# Patient Record
Sex: MALE | Race: WHITE | HISPANIC OR LATINO | Employment: OTHER | URBAN - METROPOLITAN AREA
[De-identification: names, ages, dates, MRNs, and addresses within clinical notes are randomized per-mention and may not be internally consistent; named-entity substitution may affect disease eponyms.]

---

## 2017-03-15 ENCOUNTER — FOLLOW UP (OUTPATIENT)
Dept: URBAN - METROPOLITAN AREA CLINIC 26 | Facility: CLINIC | Age: 67
End: 2017-03-15

## 2017-03-15 VITALS — HEIGHT: 60 IN | SYSTOLIC BLOOD PRESSURE: 142 MMHG | HEART RATE: 78 BPM | DIASTOLIC BLOOD PRESSURE: 77 MMHG

## 2017-03-15 DIAGNOSIS — H02.836: ICD-10-CM

## 2017-03-15 DIAGNOSIS — H02.833: ICD-10-CM

## 2017-03-15 DIAGNOSIS — H01.003: ICD-10-CM

## 2017-03-15 DIAGNOSIS — H04.123: ICD-10-CM

## 2017-03-15 DIAGNOSIS — H40.9: ICD-10-CM

## 2017-03-15 DIAGNOSIS — Z79.4: ICD-10-CM

## 2017-03-15 DIAGNOSIS — H35.3131: ICD-10-CM

## 2017-03-15 DIAGNOSIS — H43.812: ICD-10-CM

## 2017-03-15 DIAGNOSIS — H35.373: ICD-10-CM

## 2017-03-15 DIAGNOSIS — H01.006: ICD-10-CM

## 2017-03-15 DIAGNOSIS — E11.3313: ICD-10-CM

## 2017-03-15 DIAGNOSIS — H43.393: ICD-10-CM

## 2017-03-15 PROCEDURE — G8427 DOCREV CUR MEDS BY ELIG CLIN: HCPCS

## 2017-03-15 PROCEDURE — 92250 FUNDUS PHOTOGRAPHY W/I&R: CPT

## 2017-03-15 PROCEDURE — G8397 DIL MACULA/FUNDUS EXAM/W DOC: HCPCS

## 2017-03-15 PROCEDURE — 2022F DILAT RTA XM EVC RTNOPTHY: CPT

## 2017-03-15 PROCEDURE — 2021F DILAT MACULAR EXAM DONE: CPT

## 2017-03-15 PROCEDURE — 5010F MACUL RESULT PHY/QHP MNG DM: CPT

## 2017-03-15 PROCEDURE — 92014 COMPRE OPH EXAM EST PT 1/>: CPT

## 2017-03-15 PROCEDURE — 4177F TALK PT/CRGVR RE AREDS PREV: CPT

## 2017-03-15 PROCEDURE — 2019F DILATED MACUL EXAM DONE: CPT

## 2017-03-15 PROCEDURE — 1036F TOBACCO NON-USER: CPT

## 2017-03-15 PROCEDURE — G8417 CALC BMI ABV UP PARAM F/U: HCPCS

## 2017-03-15 PROCEDURE — 67210 TREATMENT OF RETINAL LESION: CPT

## 2017-03-15 ASSESSMENT — TONOMETRY
OD_IOP_MMHG: 22
OS_IOP_MMHG: 15

## 2017-03-15 ASSESSMENT — VISUAL ACUITY
OS_CC: 20/30-2
OD_CC: 20/30-2

## 2017-07-19 ENCOUNTER — FOLLOW UP (OUTPATIENT)
Dept: URBAN - METROPOLITAN AREA CLINIC 26 | Facility: CLINIC | Age: 67
End: 2017-07-19

## 2017-07-19 VITALS — HEIGHT: 60 IN | HEART RATE: 75 BPM | SYSTOLIC BLOOD PRESSURE: 128 MMHG | DIASTOLIC BLOOD PRESSURE: 79 MMHG

## 2017-07-19 DIAGNOSIS — E11.3313: ICD-10-CM

## 2017-07-19 DIAGNOSIS — H35.373: ICD-10-CM

## 2017-07-19 DIAGNOSIS — Z79.4: ICD-10-CM

## 2017-07-19 DIAGNOSIS — H43.393: ICD-10-CM

## 2017-07-19 DIAGNOSIS — H40.9: ICD-10-CM

## 2017-07-19 DIAGNOSIS — H35.3131: ICD-10-CM

## 2017-07-19 DIAGNOSIS — H43.812: ICD-10-CM

## 2017-07-19 PROCEDURE — 2021F DILAT MACULAR EXAM DONE: CPT

## 2017-07-19 PROCEDURE — 92235 FLUORESCEIN ANGRPH MLTIFRAME: CPT

## 2017-07-19 PROCEDURE — G8397 DIL MACULA/FUNDUS EXAM/W DOC: HCPCS

## 2017-07-19 PROCEDURE — 2019F DILATED MACUL EXAM DONE: CPT

## 2017-07-19 PROCEDURE — 92014 COMPRE OPH EXAM EST PT 1/>: CPT

## 2017-07-19 PROCEDURE — G8427 DOCREV CUR MEDS BY ELIG CLIN: HCPCS

## 2017-07-19 PROCEDURE — 4177F TALK PT/CRGVR RE AREDS PREV: CPT

## 2017-07-19 PROCEDURE — 2022F DILAT RTA XM EVC RTNOPTHY: CPT

## 2017-07-19 PROCEDURE — 67210 TREATMENT OF RETINAL LESION: CPT

## 2017-07-19 PROCEDURE — 92250 FUNDUS PHOTOGRAPHY W/I&R: CPT

## 2017-07-19 PROCEDURE — 5010F MACUL RESULT PHY/QHP MNG DM: CPT

## 2017-07-19 ASSESSMENT — VISUAL ACUITY
OD_PH: 20/25-3
OD_CC: 20/50+2
OS_CC: 20/25+2

## 2017-07-19 ASSESSMENT — TONOMETRY
OS_IOP_MMHG: 17
OD_IOP_MMHG: 16

## 2017-12-14 ENCOUNTER — FOLLOW UP (OUTPATIENT)
Dept: URBAN - METROPOLITAN AREA CLINIC 26 | Facility: CLINIC | Age: 67
End: 2017-12-14

## 2017-12-14 VITALS — DIASTOLIC BLOOD PRESSURE: 80 MMHG | HEART RATE: 60 BPM | HEIGHT: 60 IN | SYSTOLIC BLOOD PRESSURE: 140 MMHG

## 2017-12-14 DIAGNOSIS — E11.3313: ICD-10-CM

## 2017-12-14 DIAGNOSIS — H43.393: ICD-10-CM

## 2017-12-14 DIAGNOSIS — Z79.4: ICD-10-CM

## 2017-12-14 DIAGNOSIS — H40.9: ICD-10-CM

## 2017-12-14 DIAGNOSIS — H35.373: ICD-10-CM

## 2017-12-14 DIAGNOSIS — H43.812: ICD-10-CM

## 2017-12-14 DIAGNOSIS — H35.3131: ICD-10-CM

## 2017-12-14 PROCEDURE — 2019F DILATED MACUL EXAM DONE: CPT

## 2017-12-14 PROCEDURE — 92250 FUNDUS PHOTOGRAPHY W/I&R: CPT

## 2017-12-14 PROCEDURE — G8427 DOCREV CUR MEDS BY ELIG CLIN: HCPCS

## 2017-12-14 PROCEDURE — 92235 FLUORESCEIN ANGRPH MLTIFRAME: CPT

## 2017-12-14 PROCEDURE — 5010F MACUL RESULT PHY/QHP MNG DM: CPT

## 2017-12-14 PROCEDURE — G8397 DIL MACULA/FUNDUS EXAM/W DOC: HCPCS

## 2017-12-14 PROCEDURE — 67210 TREATMENT OF RETINAL LESION: CPT

## 2017-12-14 PROCEDURE — 2022F DILAT RTA XM EVC RTNOPTHY: CPT

## 2017-12-14 PROCEDURE — 92134 CPTRZ OPH DX IMG PST SGM RTA: CPT

## 2017-12-14 PROCEDURE — 2021F DILAT MACULAR EXAM DONE: CPT

## 2017-12-14 PROCEDURE — 92014 COMPRE OPH EXAM EST PT 1/>: CPT

## 2017-12-14 PROCEDURE — 4177F TALK PT/CRGVR RE AREDS PREV: CPT

## 2017-12-14 ASSESSMENT — VISUAL ACUITY
OD_CC: 20/30
OS_CC: 20/25-2

## 2017-12-14 ASSESSMENT — TONOMETRY
OS_IOP_MMHG: 17
OD_IOP_MMHG: 17

## 2017-12-20 ENCOUNTER — CLINIC PROCEDURE ONLY (OUTPATIENT)
Dept: URBAN - METROPOLITAN AREA CLINIC 26 | Facility: CLINIC | Age: 67
End: 2017-12-20

## 2017-12-20 DIAGNOSIS — E11.3313: ICD-10-CM

## 2017-12-20 PROCEDURE — J2778** LUCENTIS 0.1MG

## 2017-12-20 PROCEDURE — 67028 INJECTION EYE DRUG: CPT

## 2017-12-20 ASSESSMENT — VISUAL ACUITY: OD_CC: 20/40-

## 2017-12-20 ASSESSMENT — TONOMETRY: OD_IOP_MMHG: 16

## 2018-01-31 ENCOUNTER — CLINIC PROCEDURE ONLY (OUTPATIENT)
Dept: URBAN - METROPOLITAN AREA CLINIC 26 | Facility: CLINIC | Age: 68
End: 2018-01-31

## 2018-01-31 DIAGNOSIS — E11.3313: ICD-10-CM

## 2018-01-31 PROCEDURE — J2778** LUCENTIS 0.1MG

## 2018-01-31 PROCEDURE — 67028 INJECTION EYE DRUG: CPT

## 2018-01-31 ASSESSMENT — VISUAL ACUITY: OD_CC: 20/40-1

## 2018-01-31 ASSESSMENT — TONOMETRY: OD_IOP_MMHG: 16

## 2018-03-28 ENCOUNTER — FOLLOW UP AND POST INJECTION EVALUATION (OUTPATIENT)
Dept: URBAN - METROPOLITAN AREA CLINIC 26 | Facility: CLINIC | Age: 68
End: 2018-03-28

## 2018-03-28 VITALS
BODY MASS INDEX: 39.4 KG/M2 | HEIGHT: 68 IN | WEIGHT: 260 LBS | DIASTOLIC BLOOD PRESSURE: 90 MMHG | HEART RATE: 73 BPM | SYSTOLIC BLOOD PRESSURE: 167 MMHG

## 2018-03-28 DIAGNOSIS — H43.393: ICD-10-CM

## 2018-03-28 DIAGNOSIS — H01.003: ICD-10-CM

## 2018-03-28 DIAGNOSIS — Z79.4: ICD-10-CM

## 2018-03-28 DIAGNOSIS — H35.3131: ICD-10-CM

## 2018-03-28 DIAGNOSIS — E11.3313: ICD-10-CM

## 2018-03-28 DIAGNOSIS — H02.836: ICD-10-CM

## 2018-03-28 DIAGNOSIS — H40.9: ICD-10-CM

## 2018-03-28 DIAGNOSIS — H02.833: ICD-10-CM

## 2018-03-28 DIAGNOSIS — H04.123: ICD-10-CM

## 2018-03-28 DIAGNOSIS — H35.373: ICD-10-CM

## 2018-03-28 DIAGNOSIS — H43.812: ICD-10-CM

## 2018-03-28 DIAGNOSIS — H01.006: ICD-10-CM

## 2018-03-28 PROCEDURE — 92250 FUNDUS PHOTOGRAPHY W/I&R: CPT

## 2018-03-28 PROCEDURE — 92014 COMPRE OPH EXAM EST PT 1/>: CPT

## 2018-03-28 PROCEDURE — 67210 TREATMENT OF RETINAL LESION: CPT

## 2018-03-28 PROCEDURE — 92235 FLUORESCEIN ANGRPH MLTIFRAME: CPT

## 2018-03-28 PROCEDURE — 92134 CPTRZ OPH DX IMG PST SGM RTA: CPT

## 2018-03-28 ASSESSMENT — VISUAL ACUITY
OS_CC: 20/25
OD_CC: 20/30-2

## 2018-03-28 ASSESSMENT — TONOMETRY
OD_IOP_MMHG: 19
OS_IOP_MMHG: 22

## 2018-04-03 ENCOUNTER — CLINIC PROCEDURE ONLY (OUTPATIENT)
Dept: URBAN - METROPOLITAN AREA CLINIC 26 | Facility: CLINIC | Age: 68
End: 2018-04-03

## 2018-04-03 DIAGNOSIS — E11.3313: ICD-10-CM

## 2018-04-03 PROCEDURE — 67028 INJECTION EYE DRUG: CPT

## 2018-04-03 PROCEDURE — J2778** LUCENTIS 0.1MG

## 2018-04-03 ASSESSMENT — VISUAL ACUITY: OD_CC: 20/30

## 2018-04-03 ASSESSMENT — TONOMETRY: OD_IOP_MMHG: 16

## 2018-05-15 ENCOUNTER — CLINIC PROCEDURE ONLY (OUTPATIENT)
Dept: URBAN - METROPOLITAN AREA CLINIC 26 | Facility: CLINIC | Age: 68
End: 2018-05-15

## 2018-05-15 DIAGNOSIS — E11.3313: ICD-10-CM

## 2018-05-15 PROCEDURE — 67028 INJECTION EYE DRUG: CPT

## 2018-05-15 PROCEDURE — J2778** LUCENTIS 0.1MG

## 2018-05-15 ASSESSMENT — TONOMETRY: OD_IOP_MMHG: 21

## 2018-05-15 ASSESSMENT — VISUAL ACUITY: OD_CC: 20/30

## 2018-07-10 ENCOUNTER — FOLLOW UP (OUTPATIENT)
Dept: URBAN - METROPOLITAN AREA CLINIC 26 | Facility: CLINIC | Age: 68
End: 2018-07-10

## 2018-07-10 DIAGNOSIS — H35.373: ICD-10-CM

## 2018-07-10 DIAGNOSIS — H43.393: ICD-10-CM

## 2018-07-10 DIAGNOSIS — H40.9: ICD-10-CM

## 2018-07-10 DIAGNOSIS — E11.3313: ICD-10-CM

## 2018-07-10 DIAGNOSIS — H35.3131: ICD-10-CM

## 2018-07-10 DIAGNOSIS — H04.123: ICD-10-CM

## 2018-07-10 DIAGNOSIS — H43.812: ICD-10-CM

## 2018-07-10 DIAGNOSIS — Z79.4: ICD-10-CM

## 2018-07-10 PROCEDURE — 92250 FUNDUS PHOTOGRAPHY W/I&R: CPT

## 2018-07-10 PROCEDURE — 92134 CPTRZ OPH DX IMG PST SGM RTA: CPT

## 2018-07-10 PROCEDURE — 92014 COMPRE OPH EXAM EST PT 1/>: CPT

## 2018-07-10 PROCEDURE — 67210 TREATMENT OF RETINAL LESION: CPT

## 2018-07-10 PROCEDURE — 92235 FLUORESCEIN ANGRPH MLTIFRAME: CPT

## 2018-07-10 ASSESSMENT — VISUAL ACUITY
OD_CC: 20/30+2
OS_CC: 20/25-2

## 2018-07-10 ASSESSMENT — TONOMETRY
OS_IOP_MMHG: 17
OD_IOP_MMHG: 21

## 2018-07-18 ENCOUNTER — CLINIC PROCEDURE ONLY (OUTPATIENT)
Dept: URBAN - METROPOLITAN AREA CLINIC 26 | Facility: CLINIC | Age: 68
End: 2018-07-18

## 2018-07-18 DIAGNOSIS — E11.3313: ICD-10-CM

## 2018-07-18 PROCEDURE — J2778** LUCENTIS 0.1MG

## 2018-07-18 PROCEDURE — 67028 INJECTION EYE DRUG: CPT

## 2018-07-18 ASSESSMENT — TONOMETRY
OD_IOP_MMHG: 24
OD_IOP_MMHG: 10

## 2018-07-18 ASSESSMENT — VISUAL ACUITY: OD_CC: 20/50+2

## 2018-08-29 ENCOUNTER — CLINIC PROCEDURE ONLY (OUTPATIENT)
Dept: URBAN - METROPOLITAN AREA CLINIC 26 | Facility: CLINIC | Age: 68
End: 2018-08-29

## 2018-08-29 DIAGNOSIS — E11.3313: ICD-10-CM

## 2018-08-29 PROCEDURE — 67028 INJECTION EYE DRUG: CPT

## 2018-08-29 ASSESSMENT — VISUAL ACUITY: OD_CC: 20/40-

## 2018-08-29 ASSESSMENT — TONOMETRY: OD_IOP_MMHG: 13

## 2018-10-31 ENCOUNTER — FOLLOW UP (OUTPATIENT)
Dept: URBAN - METROPOLITAN AREA CLINIC 26 | Facility: CLINIC | Age: 68
End: 2018-10-31

## 2018-10-31 VITALS — DIASTOLIC BLOOD PRESSURE: 82 MMHG | SYSTOLIC BLOOD PRESSURE: 147 MMHG | HEIGHT: 60 IN | HEART RATE: 70 BPM

## 2018-10-31 DIAGNOSIS — Z79.4: ICD-10-CM

## 2018-10-31 DIAGNOSIS — H40.9: ICD-10-CM

## 2018-10-31 DIAGNOSIS — H43.393: ICD-10-CM

## 2018-10-31 DIAGNOSIS — H01.003: ICD-10-CM

## 2018-10-31 DIAGNOSIS — H35.3131: ICD-10-CM

## 2018-10-31 DIAGNOSIS — H43.812: ICD-10-CM

## 2018-10-31 DIAGNOSIS — H35.373: ICD-10-CM

## 2018-10-31 DIAGNOSIS — H02.836: ICD-10-CM

## 2018-10-31 DIAGNOSIS — H04.123: ICD-10-CM

## 2018-10-31 DIAGNOSIS — H01.006: ICD-10-CM

## 2018-10-31 DIAGNOSIS — H02.833: ICD-10-CM

## 2018-10-31 DIAGNOSIS — E11.3313: ICD-10-CM

## 2018-10-31 PROCEDURE — 92134 CPTRZ OPH DX IMG PST SGM RTA: CPT

## 2018-10-31 PROCEDURE — 92250 FUNDUS PHOTOGRAPHY W/I&R: CPT

## 2018-10-31 PROCEDURE — 67028 INJECTION EYE DRUG: CPT

## 2018-10-31 PROCEDURE — 92235 FLUORESCEIN ANGRPH MLTIFRAME: CPT

## 2018-10-31 PROCEDURE — 92014 COMPRE OPH EXAM EST PT 1/>: CPT

## 2018-10-31 ASSESSMENT — VISUAL ACUITY
OD_CC: 20/25
OS_CC: 20/15-2

## 2018-10-31 ASSESSMENT — TONOMETRY
OD_IOP_MMHG: 24
OS_IOP_MMHG: 25

## 2019-01-30 ENCOUNTER — FOLLOW UP (OUTPATIENT)
Dept: URBAN - METROPOLITAN AREA CLINIC 26 | Facility: CLINIC | Age: 69
End: 2019-01-30

## 2019-01-30 VITALS
DIASTOLIC BLOOD PRESSURE: 79 MMHG | HEIGHT: 68 IN | HEART RATE: 95 BPM | WEIGHT: 275 LBS | BODY MASS INDEX: 41.68 KG/M2 | SYSTOLIC BLOOD PRESSURE: 145 MMHG

## 2019-01-30 DIAGNOSIS — H43.393: ICD-10-CM

## 2019-01-30 DIAGNOSIS — Z79.4: ICD-10-CM

## 2019-01-30 DIAGNOSIS — H04.123: ICD-10-CM

## 2019-01-30 DIAGNOSIS — H43.812: ICD-10-CM

## 2019-01-30 DIAGNOSIS — E11.3313: ICD-10-CM

## 2019-01-30 DIAGNOSIS — H02.836: ICD-10-CM

## 2019-01-30 DIAGNOSIS — H35.3131: ICD-10-CM

## 2019-01-30 DIAGNOSIS — H35.373: ICD-10-CM

## 2019-01-30 DIAGNOSIS — H02.833: ICD-10-CM

## 2019-01-30 DIAGNOSIS — H40.9: ICD-10-CM

## 2019-01-30 PROCEDURE — 92235 FLUORESCEIN ANGRPH MLTIFRAME: CPT

## 2019-01-30 PROCEDURE — 92014 COMPRE OPH EXAM EST PT 1/>: CPT

## 2019-01-30 PROCEDURE — 92250 FUNDUS PHOTOGRAPHY W/I&R: CPT

## 2019-01-30 PROCEDURE — 67028 INJECTION EYE DRUG: CPT

## 2019-01-30 PROCEDURE — 92134 CPTRZ OPH DX IMG PST SGM RTA: CPT

## 2019-01-30 ASSESSMENT — TONOMETRY
OD_IOP_MMHG: 16
OS_IOP_MMHG: 15

## 2019-01-30 ASSESSMENT — VISUAL ACUITY
OD_CC: 20/20-2
OS_CC: 20/15-

## 2019-03-13 ENCOUNTER — CLINIC PROCEDURE ONLY (OUTPATIENT)
Dept: URBAN - METROPOLITAN AREA CLINIC 26 | Facility: CLINIC | Age: 69
End: 2019-03-13

## 2019-03-13 DIAGNOSIS — E11.3313: ICD-10-CM

## 2019-03-13 PROCEDURE — J2778** LUCENTIS 0.1MG

## 2019-03-13 PROCEDURE — 67028 INJECTION EYE DRUG: CPT

## 2019-03-13 ASSESSMENT — TONOMETRY
OD_IOP_MMHG: 25
OD_IOP_MMHG: 22

## 2019-03-13 ASSESSMENT — VISUAL ACUITY: OD_SC: 20/20-2

## 2019-04-24 ENCOUNTER — FOLLOW UP AND POST INJECTION EVALUATION (OUTPATIENT)
Dept: URBAN - METROPOLITAN AREA CLINIC 26 | Facility: CLINIC | Age: 69
End: 2019-04-24

## 2019-04-24 VITALS — HEIGHT: 68 IN | WEIGHT: 277 LBS | BODY MASS INDEX: 41.98 KG/M2

## 2019-04-24 DIAGNOSIS — H02.836: ICD-10-CM

## 2019-04-24 DIAGNOSIS — H02.833: ICD-10-CM

## 2019-04-24 DIAGNOSIS — H01.003: ICD-10-CM

## 2019-04-24 DIAGNOSIS — H35.373: ICD-10-CM

## 2019-04-24 DIAGNOSIS — E11.3313: ICD-10-CM

## 2019-04-24 DIAGNOSIS — H43.393: ICD-10-CM

## 2019-04-24 DIAGNOSIS — H40.9: ICD-10-CM

## 2019-04-24 DIAGNOSIS — H04.123: ICD-10-CM

## 2019-04-24 DIAGNOSIS — H01.006: ICD-10-CM

## 2019-04-24 DIAGNOSIS — Z79.4: ICD-10-CM

## 2019-04-24 DIAGNOSIS — H43.812: ICD-10-CM

## 2019-04-24 DIAGNOSIS — H35.3131: ICD-10-CM

## 2019-04-24 PROCEDURE — 76514 ECHO EXAM OF EYE THICKNESS: CPT

## 2019-04-24 PROCEDURE — 92134 CPTRZ OPH DX IMG PST SGM RTA: CPT

## 2019-04-24 PROCEDURE — 92250 FUNDUS PHOTOGRAPHY W/I&R: CPT

## 2019-04-24 PROCEDURE — 92014 COMPRE OPH EXAM EST PT 1/>: CPT

## 2019-04-24 ASSESSMENT — TONOMETRY
OD_IOP_MMHG: 22
OS_IOP_MMHG: 24

## 2019-04-24 ASSESSMENT — VISUAL ACUITY
OS_CC: 20/20-
OD_CC: 20/20-

## 2019-07-01 ENCOUNTER — FOLLOW UP AND POST INJECTION EVALUATION (OUTPATIENT)
Dept: URBAN - METROPOLITAN AREA CLINIC 26 | Facility: CLINIC | Age: 69
End: 2019-07-01

## 2019-07-01 VITALS — SYSTOLIC BLOOD PRESSURE: 172 MMHG | DIASTOLIC BLOOD PRESSURE: 87 MMHG | HEART RATE: 80 BPM | HEIGHT: 60 IN

## 2019-07-01 DIAGNOSIS — H35.373: ICD-10-CM

## 2019-07-01 DIAGNOSIS — H40.9: ICD-10-CM

## 2019-07-01 DIAGNOSIS — H43.393: ICD-10-CM

## 2019-07-01 DIAGNOSIS — Z79.4: ICD-10-CM

## 2019-07-01 DIAGNOSIS — E11.3313: ICD-10-CM

## 2019-07-01 DIAGNOSIS — H35.3131: ICD-10-CM

## 2019-07-01 DIAGNOSIS — H43.812: ICD-10-CM

## 2019-07-01 PROCEDURE — 92250 FUNDUS PHOTOGRAPHY W/I&R: CPT

## 2019-07-01 PROCEDURE — 67028 INJECTION EYE DRUG: CPT

## 2019-07-01 PROCEDURE — 92134 CPTRZ OPH DX IMG PST SGM RTA: CPT

## 2019-07-01 PROCEDURE — 92020 GONIOSCOPY: CPT

## 2019-07-01 PROCEDURE — 92014 COMPRE OPH EXAM EST PT 1/>: CPT

## 2019-07-01 PROCEDURE — 92235 FLUORESCEIN ANGRPH MLTIFRAME: CPT

## 2019-07-01 ASSESSMENT — VISUAL ACUITY
OD_CC: 20/20-1
OS_CC: 20/20-1

## 2019-07-01 ASSESSMENT — TONOMETRY
OD_IOP_MMHG: 18
OS_IOP_MMHG: 20

## 2019-10-07 ENCOUNTER — FOLLOW UP AND POST INJECTION EVALUATION (OUTPATIENT)
Dept: URBAN - METROPOLITAN AREA CLINIC 26 | Facility: CLINIC | Age: 69
End: 2019-10-07

## 2019-10-07 VITALS — DIASTOLIC BLOOD PRESSURE: 73 MMHG | HEART RATE: 89 BPM | SYSTOLIC BLOOD PRESSURE: 143 MMHG | HEIGHT: 60 IN

## 2019-10-07 DIAGNOSIS — H35.373: ICD-10-CM

## 2019-10-07 DIAGNOSIS — H43.812: ICD-10-CM

## 2019-10-07 DIAGNOSIS — Z79.4: ICD-10-CM

## 2019-10-07 DIAGNOSIS — H02.833: ICD-10-CM

## 2019-10-07 DIAGNOSIS — H35.3131: ICD-10-CM

## 2019-10-07 DIAGNOSIS — H01.006: ICD-10-CM

## 2019-10-07 DIAGNOSIS — H01.003: ICD-10-CM

## 2019-10-07 DIAGNOSIS — H02.836: ICD-10-CM

## 2019-10-07 DIAGNOSIS — H04.123: ICD-10-CM

## 2019-10-07 DIAGNOSIS — E11.3313: ICD-10-CM

## 2019-10-07 DIAGNOSIS — H43.393: ICD-10-CM

## 2019-10-07 DIAGNOSIS — H40.9: ICD-10-CM

## 2019-10-07 PROCEDURE — 92250 FUNDUS PHOTOGRAPHY W/I&R: CPT

## 2019-10-07 PROCEDURE — J2778** LUCENTIS 0.1MG

## 2019-10-07 PROCEDURE — 92134 CPTRZ OPH DX IMG PST SGM RTA: CPT

## 2019-10-07 PROCEDURE — 92235 FLUORESCEIN ANGRPH MLTIFRAME: CPT

## 2019-10-07 PROCEDURE — 67028 INJECTION EYE DRUG: CPT

## 2019-10-07 PROCEDURE — 92014 COMPRE OPH EXAM EST PT 1/>: CPT

## 2019-10-07 ASSESSMENT — VISUAL ACUITY
OS_CC: 20/40+1
OS_PH: 20/25+2
OD_CC: 20/25+2

## 2019-10-07 ASSESSMENT — TONOMETRY
OD_IOP_MMHG: 18
OS_IOP_MMHG: 18

## 2020-06-02 ENCOUNTER — FOLLOW UP AND POST INJECTION EVALUATION (OUTPATIENT)
Dept: URBAN - METROPOLITAN AREA CLINIC 26 | Facility: CLINIC | Age: 70
End: 2020-06-02

## 2020-06-02 VITALS
HEIGHT: 68 IN | BODY MASS INDEX: 42.13 KG/M2 | DIASTOLIC BLOOD PRESSURE: 89 MMHG | WEIGHT: 278 LBS | SYSTOLIC BLOOD PRESSURE: 170 MMHG | HEART RATE: 84 BPM

## 2020-06-02 DIAGNOSIS — H40.9: ICD-10-CM

## 2020-06-02 DIAGNOSIS — H35.373: ICD-10-CM

## 2020-06-02 DIAGNOSIS — E11.3313: ICD-10-CM

## 2020-06-02 DIAGNOSIS — H35.3131: ICD-10-CM

## 2020-06-02 DIAGNOSIS — H43.393: ICD-10-CM

## 2020-06-02 DIAGNOSIS — Z79.4: ICD-10-CM

## 2020-06-02 DIAGNOSIS — H43.812: ICD-10-CM

## 2020-06-02 PROCEDURE — 92014 COMPRE OPH EXAM EST PT 1/>: CPT

## 2020-06-02 PROCEDURE — 92235 FLUORESCEIN ANGRPH MLTIFRAME: CPT

## 2020-06-02 PROCEDURE — 92134 CPTRZ OPH DX IMG PST SGM RTA: CPT

## 2020-06-02 PROCEDURE — 92250 FUNDUS PHOTOGRAPHY W/I&R: CPT

## 2020-06-02 ASSESSMENT — VISUAL ACUITY
OD_CC: 20/25-1
OS_CC: 20/20-1

## 2020-06-02 ASSESSMENT — TONOMETRY
OD_IOP_MMHG: 17
OS_IOP_MMHG: 18

## 2020-06-08 ENCOUNTER — TELEPHONE ENCOUNTER (OUTPATIENT)
Dept: URBAN - METROPOLITAN AREA CLINIC 68 | Facility: CLINIC | Age: 70
End: 2020-06-08

## 2020-08-12 ENCOUNTER — OFFICE VISIT (OUTPATIENT)
Dept: URBAN - METROPOLITAN AREA CLINIC 68 | Facility: CLINIC | Age: 70
End: 2020-08-12

## 2020-08-26 ENCOUNTER — TELEPHONE ENCOUNTER (OUTPATIENT)
Dept: URBAN - METROPOLITAN AREA CLINIC 68 | Facility: CLINIC | Age: 70
End: 2020-08-26

## 2020-11-04 ENCOUNTER — OFFICE VISIT (OUTPATIENT)
Dept: URBAN - METROPOLITAN AREA CLINIC 68 | Facility: CLINIC | Age: 70
End: 2020-11-04

## 2020-11-23 ENCOUNTER — TELEPHONE ENCOUNTER (OUTPATIENT)
Dept: URBAN - METROPOLITAN AREA CLINIC 68 | Facility: CLINIC | Age: 70
End: 2020-11-23

## 2020-12-02 ENCOUNTER — FOLLOW UP (OUTPATIENT)
Dept: URBAN - METROPOLITAN AREA CLINIC 26 | Facility: CLINIC | Age: 70
End: 2020-12-02

## 2020-12-02 VITALS — SYSTOLIC BLOOD PRESSURE: 150 MMHG | HEIGHT: 60 IN | DIASTOLIC BLOOD PRESSURE: 75 MMHG | HEART RATE: 75 BPM

## 2020-12-02 DIAGNOSIS — H35.3131: ICD-10-CM

## 2020-12-02 DIAGNOSIS — H40.9: ICD-10-CM

## 2020-12-02 DIAGNOSIS — H35.373: ICD-10-CM

## 2020-12-02 DIAGNOSIS — H43.812: ICD-10-CM

## 2020-12-02 DIAGNOSIS — H02.836: ICD-10-CM

## 2020-12-02 DIAGNOSIS — H02.833: ICD-10-CM

## 2020-12-02 DIAGNOSIS — Z79.4: ICD-10-CM

## 2020-12-02 DIAGNOSIS — H04.123: ICD-10-CM

## 2020-12-02 DIAGNOSIS — H01.003: ICD-10-CM

## 2020-12-02 DIAGNOSIS — E11.3313: ICD-10-CM

## 2020-12-02 DIAGNOSIS — H43.393: ICD-10-CM

## 2020-12-02 DIAGNOSIS — H01.006: ICD-10-CM

## 2020-12-02 PROCEDURE — 92235 FLUORESCEIN ANGRPH MLTIFRAME: CPT

## 2020-12-02 PROCEDURE — 92020 GONIOSCOPY: CPT

## 2020-12-02 PROCEDURE — 92134 CPTRZ OPH DX IMG PST SGM RTA: CPT

## 2020-12-02 PROCEDURE — 92014 COMPRE OPH EXAM EST PT 1/>: CPT

## 2020-12-02 PROCEDURE — 92250 FUNDUS PHOTOGRAPHY W/I&R: CPT

## 2020-12-02 RX ORDER — LATANOPROST 50 UG/ML: 1 SOLUTION/ DROPS OPHTHALMIC EVERY EVENING

## 2020-12-02 ASSESSMENT — VISUAL ACUITY
OS_CC: 20/20-1
OD_CC: 20/20-1

## 2020-12-02 ASSESSMENT — TONOMETRY
OD_IOP_MMHG: 20
OD_IOP_MMHG: 20
OS_IOP_MMHG: 30
OS_IOP_MMHG: 22
OD_IOP_MMHG: 29
OS_IOP_MMHG: 23

## 2021-01-27 ENCOUNTER — OFFICE VISIT (OUTPATIENT)
Dept: URBAN - METROPOLITAN AREA CLINIC 68 | Facility: CLINIC | Age: 71
End: 2021-01-27

## 2021-02-01 ENCOUNTER — TELEPHONE ENCOUNTER (OUTPATIENT)
Dept: URBAN - METROPOLITAN AREA CLINIC 68 | Facility: CLINIC | Age: 71
End: 2021-02-01

## 2021-02-02 ENCOUNTER — OFFICE VISIT (OUTPATIENT)
Dept: URBAN - METROPOLITAN AREA CLINIC 68 | Facility: CLINIC | Age: 71
End: 2021-02-02

## 2021-04-05 ENCOUNTER — TELEPHONE ENCOUNTER (OUTPATIENT)
Dept: URBAN - METROPOLITAN AREA CLINIC 68 | Facility: CLINIC | Age: 71
End: 2021-04-05

## 2021-04-06 ENCOUNTER — OFFICE VISIT (OUTPATIENT)
Dept: URBAN - METROPOLITAN AREA CLINIC 68 | Facility: CLINIC | Age: 71
End: 2021-04-06

## 2021-04-21 ENCOUNTER — OFFICE VISIT (OUTPATIENT)
Dept: URBAN - METROPOLITAN AREA CLINIC 68 | Facility: CLINIC | Age: 71
End: 2021-04-21

## 2021-05-26 ENCOUNTER — OFFICE VISIT (OUTPATIENT)
Dept: URBAN - METROPOLITAN AREA CLINIC 68 | Facility: CLINIC | Age: 71
End: 2021-05-26

## 2021-06-01 ENCOUNTER — FOLLOW UP (OUTPATIENT)
Dept: URBAN - METROPOLITAN AREA CLINIC 26 | Facility: CLINIC | Age: 71
End: 2021-06-01

## 2021-06-01 VITALS
HEIGHT: 68 IN | BODY MASS INDEX: 37.59 KG/M2 | HEART RATE: 73 BPM | SYSTOLIC BLOOD PRESSURE: 151 MMHG | DIASTOLIC BLOOD PRESSURE: 79 MMHG | WEIGHT: 248 LBS

## 2021-06-01 DIAGNOSIS — H35.3131: ICD-10-CM

## 2021-06-01 DIAGNOSIS — H43.393: ICD-10-CM

## 2021-06-01 DIAGNOSIS — H43.812: ICD-10-CM

## 2021-06-01 DIAGNOSIS — Z79.4: ICD-10-CM

## 2021-06-01 DIAGNOSIS — H35.373: ICD-10-CM

## 2021-06-01 DIAGNOSIS — H40.9: ICD-10-CM

## 2021-06-01 DIAGNOSIS — E11.3313: ICD-10-CM

## 2021-06-01 PROCEDURE — 92250 FUNDUS PHOTOGRAPHY W/I&R: CPT

## 2021-06-01 PROCEDURE — 92014 COMPRE OPH EXAM EST PT 1/>: CPT

## 2021-06-01 PROCEDURE — 92134 CPTRZ OPH DX IMG PST SGM RTA: CPT

## 2021-06-01 PROCEDURE — 92235 FLUORESCEIN ANGRPH MLTIFRAME: CPT

## 2021-06-01 ASSESSMENT — TONOMETRY
OS_IOP_MMHG: 22
OD_IOP_MMHG: 17
OS_IOP_MMHG: 20
OD_IOP_MMHG: 21

## 2021-06-01 ASSESSMENT — VISUAL ACUITY
OS_CC: 20/25-2
OD_CC: 20/20

## 2021-06-03 ENCOUNTER — TELEPHONE ENCOUNTER (OUTPATIENT)
Dept: URBAN - METROPOLITAN AREA CLINIC 68 | Facility: CLINIC | Age: 71
End: 2021-06-03

## 2021-06-24 ENCOUNTER — OFFICE VISIT (OUTPATIENT)
Dept: URBAN - METROPOLITAN AREA CLINIC 68 | Facility: CLINIC | Age: 71
End: 2021-06-24

## 2021-07-15 ENCOUNTER — OFFICE VISIT (OUTPATIENT)
Dept: URBAN - METROPOLITAN AREA CLINIC 68 | Facility: CLINIC | Age: 71
End: 2021-07-15

## 2021-07-20 ENCOUNTER — OFFICE VISIT (OUTPATIENT)
Dept: URBAN - METROPOLITAN AREA CLINIC 68 | Facility: CLINIC | Age: 71
End: 2021-07-20

## 2021-08-03 ENCOUNTER — TELEPHONE ENCOUNTER (OUTPATIENT)
Dept: URBAN - METROPOLITAN AREA CLINIC 68 | Facility: CLINIC | Age: 71
End: 2021-08-03

## 2021-10-03 ENCOUNTER — LAB OUTSIDE AN ENCOUNTER (OUTPATIENT)
Dept: URBAN - METROPOLITAN AREA CLINIC 68 | Facility: CLINIC | Age: 71
End: 2021-10-03

## 2021-10-05 ENCOUNTER — OFFICE VISIT (OUTPATIENT)
Dept: URBAN - METROPOLITAN AREA CLINIC 68 | Facility: CLINIC | Age: 71
End: 2021-10-05

## 2021-10-13 ENCOUNTER — TELEPHONE ENCOUNTER (OUTPATIENT)
Dept: URBAN - METROPOLITAN AREA CLINIC 68 | Facility: CLINIC | Age: 71
End: 2021-10-13

## 2021-12-01 ENCOUNTER — FOLLOW UP (OUTPATIENT)
Dept: URBAN - METROPOLITAN AREA CLINIC 26 | Facility: CLINIC | Age: 71
End: 2021-12-01

## 2021-12-01 VITALS — SYSTOLIC BLOOD PRESSURE: 138 MMHG | HEIGHT: 60 IN | DIASTOLIC BLOOD PRESSURE: 73 MMHG | HEART RATE: 79 BPM

## 2021-12-01 DIAGNOSIS — H35.373: ICD-10-CM

## 2021-12-01 DIAGNOSIS — H43.393: ICD-10-CM

## 2021-12-01 DIAGNOSIS — H43.812: ICD-10-CM

## 2021-12-01 DIAGNOSIS — E11.3311: ICD-10-CM

## 2021-12-01 DIAGNOSIS — E11.3312: ICD-10-CM

## 2021-12-01 DIAGNOSIS — H04.123: ICD-10-CM

## 2021-12-01 DIAGNOSIS — H40.9: ICD-10-CM

## 2021-12-01 DIAGNOSIS — H35.3131: ICD-10-CM

## 2021-12-01 DIAGNOSIS — Z79.4: ICD-10-CM

## 2021-12-01 PROCEDURE — 92014 COMPRE OPH EXAM EST PT 1/>: CPT

## 2021-12-01 PROCEDURE — 92134 CPTRZ OPH DX IMG PST SGM RTA: CPT

## 2021-12-01 PROCEDURE — 92235 FLUORESCEIN ANGRPH MLTIFRAME: CPT

## 2021-12-01 PROCEDURE — 92250 FUNDUS PHOTOGRAPHY W/I&R: CPT

## 2021-12-01 ASSESSMENT — TONOMETRY
OS_IOP_MMHG: 16
OD_IOP_MMHG: 13

## 2021-12-01 ASSESSMENT — VISUAL ACUITY
OD_CC: 20/20-2
OS_CC: 20/20

## 2022-01-03 ENCOUNTER — OFFICE VISIT (OUTPATIENT)
Dept: URBAN - METROPOLITAN AREA CLINIC 68 | Facility: CLINIC | Age: 72
End: 2022-01-03

## 2022-01-10 ENCOUNTER — TELEPHONE ENCOUNTER (OUTPATIENT)
Dept: URBAN - METROPOLITAN AREA CLINIC 68 | Facility: CLINIC | Age: 72
End: 2022-01-10

## 2022-03-21 ENCOUNTER — OFFICE VISIT (OUTPATIENT)
Dept: URBAN - METROPOLITAN AREA CLINIC 68 | Facility: CLINIC | Age: 72
End: 2022-03-21

## 2022-06-01 ENCOUNTER — FOLLOW UP (OUTPATIENT)
Dept: URBAN - METROPOLITAN AREA CLINIC 26 | Facility: CLINIC | Age: 72
End: 2022-06-01

## 2022-06-01 VITALS
HEART RATE: 62 BPM | DIASTOLIC BLOOD PRESSURE: 77 MMHG | SYSTOLIC BLOOD PRESSURE: 146 MMHG | WEIGHT: 255 LBS | BODY MASS INDEX: 38.65 KG/M2 | HEIGHT: 68 IN

## 2022-06-01 ASSESSMENT — VISUAL ACUITY
OD_CC: 20/30-1
OS_CC: 20/30-1

## 2022-06-01 ASSESSMENT — TONOMETRY
OS_IOP_MMHG: 18
OD_IOP_MMHG: 20

## 2022-06-04 ENCOUNTER — TELEPHONE ENCOUNTER (OUTPATIENT)
Dept: URBAN - METROPOLITAN AREA CLINIC 68 | Facility: CLINIC | Age: 72
End: 2022-06-04

## 2022-06-04 RX ORDER — GABAPENTIN 100 MG/1
GABAPENTIN( 100MG ORAL 2 2  AT BEDTIME ) DISCONTINUED -HX ENTRY CAPSULE ORAL
OUTPATIENT
Start: 2022-03-21 | End: 2022-03-21

## 2022-06-04 RX ORDER — LISINOPRIL 5 MG/1
LISINOPRIL( 5MG ORAL  DAILY ) INACTIVE -HX ENTRY TABLET ORAL DAILY
OUTPATIENT
Start: 2019-01-28

## 2022-06-04 RX ORDER — HYALURONATE,MOD.,NON-CROSSLINK 24 MG/3 ML
SYRINGE (ML) INTRAARTICULAR
OUTPATIENT
Start: 2019-01-28

## 2022-06-04 RX ORDER — HYDROMORPHONE HYDROCHLORIDE 4 MG/1
HYDROMORPHONE HCL( 4MG ORAL 1 AS NEEDED ) INACTIVE -HX ENTRY TABLET ORAL AS NEEDED
OUTPATIENT
Start: 2021-04-06

## 2022-06-04 RX ORDER — BUDESONIDE 9 MG/1
TABLET, EXTENDED RELEASE ORAL DAILY
Qty: 1 | Refills: 0 | OUTPATIENT
Start: 2017-08-07 | End: 2017-08-13

## 2022-06-04 RX ORDER — ZINC OXIDE 200 MG/G
OINTMENT TOPICAL AS DIRECTED
Qty: 30 | Refills: 0 | OUTPATIENT
Start: 2021-04-21 | End: 2021-05-21

## 2022-06-04 RX ORDER — SULFASALAZINE 500 MG/1
SULFASALAZINE( 500MG ORAL 2 BY MOUTH TWICE EACH DAY.  ) INACTIVE -HX ENTRY TABLET ORAL
OUTPATIENT
Start: 2017-12-14

## 2022-06-04 RX ORDER — MIRABEGRON 25 MG/1
MYRBETRIQ( 25MG ORAL  DAILY ) INACTIVE -HX ENTRY TABLET, FILM COATED, EXTENDED RELEASE ORAL DAILY
OUTPATIENT
Start: 2021-04-06

## 2022-06-04 RX ORDER — DEXLANSOPRAZOLE 60 MG/1
DEXILANT( 60MG ORAL  DAILY ) INACTIVE -HX ENTRY CAPSULE, DELAYED RELEASE ORAL DAILY
OUTPATIENT
Start: 2020-12-03

## 2022-06-04 RX ORDER — PRAVASTATIN SODIUM 20 MG/1
PRAVASTATIN SODIUM( 20MG ORAL 1 AT BEDTIME ) INACTIVE -HX ENTRY TABLET ORAL AT BEDTIME
OUTPATIENT
Start: 2018-03-15

## 2022-06-04 RX ORDER — LISINOPRIL 5 MG/1
LISINOPRIL( 5MG ORAL  DAILY ) DISCONTINUED -HX ENTRY TABLET ORAL DAILY
OUTPATIENT
Start: 2022-03-21 | End: 2022-03-21

## 2022-06-04 RX ORDER — MESALAMINE 1.2 G/1
TABLET, DELAYED RELEASE ORAL DAILY
Qty: 48 | Refills: 48 | OUTPATIENT
Start: 2017-09-28 | End: 2018-01-04

## 2022-06-04 RX ORDER — TAMSULOSIN HYDROCHLORIDE 0.4 MG/1
FLOMAX( 0.4MG ORAL  DAILY ) INACTIVE -HX ENTRY CAPSULE ORAL DAILY
OUTPATIENT
Start: 2019-06-11

## 2022-06-04 RX ORDER — DEXLANSOPRAZOLE 60 MG/1
CAPSULE, DELAYED RELEASE ORAL DAILY
Qty: 20 | Refills: 0 | OUTPATIENT
Start: 2018-05-23 | End: 2018-06-12

## 2022-06-04 RX ORDER — ROSUVASTATIN CALCIUM 5 MG
CRESTOR( 5MG ORAL  DAILY ) INACTIVE -HX ENTRY TABLET ORAL DAILY
OUTPATIENT
Start: 2021-07-20

## 2022-06-04 RX ORDER — LOSARTAN POTASSIUM 25 MG/1
LOSARTAN POTASSIUM( 25MG ORAL  DAILY ) INACTIVE -HX ENTRY TABLET ORAL DAILY
OUTPATIENT
Start: 2021-07-20

## 2022-06-04 RX ORDER — FLUTICASONE FUROATE AND VILANTEROL TRIFENATATE 200; 25 UG/1; UG/1
BREO ELLIPTA( 200-25MCG/INH INHALATION  DAILY ) INACTIVE -HX ENTRY POWDER RESPIRATORY (INHALATION) DAILY
OUTPATIENT
Start: 2021-07-20

## 2022-06-04 RX ORDER — ROSUVASTATIN CALCIUM 10 MG
CRESTOR( 10MG ORAL  DAILY ) INACTIVE -HX ENTRY TABLET ORAL DAILY
OUTPATIENT
Start: 2019-06-11

## 2022-06-04 RX ORDER — DOCUSATE SODIUM 100 MG/1
COLACE( 100MG ORAL 2 DAILY ) INACTIVE -HX ENTRY CAPSULE ORAL DAILY
OUTPATIENT
Start: 2021-07-20

## 2022-06-04 RX ORDER — CANAGLIFLOZIN AND METFORMIN HYDROCHLORIDE 50; 1000 MG/1; MG/1
INVOKAMET XR( 50-1000MG ORAL 2 DAILY ) INACTIVE -HX ENTRY TABLET, FILM COATED, EXTENDED RELEASE ORAL DAILY
OUTPATIENT
Start: 2018-01-04

## 2022-06-04 RX ORDER — BACLOFEN 10 MG/1
BACLOFEN( 10MG ORAL 1 THREE TIMES DAILY ) INACTIVE -HX ENTRY TABLET ORAL
OUTPATIENT
Start: 2019-12-03

## 2022-06-04 RX ORDER — HYDROXYCHLOROQUINE SULFATE 200 MG/1
HYDROXYCHLOROQUINE SULFATE( 200MG ORAL 1 TWO TIMES DAILY ) INACTIVE -HX ENTRY TABLET, FILM COATED ORAL
OUTPATIENT
Start: 2021-07-20

## 2022-06-04 RX ORDER — METOPROLOL SUCCINATE 25 MG/1
METOPROLOL SUCCINATE ER( 25MG ORAL 1 TWICE A DAY ) INACTIVE -HX ENTRY TABLET, EXTENDED RELEASE ORAL TWICE A DAY
OUTPATIENT
Start: 2018-01-04

## 2022-06-05 ENCOUNTER — TELEPHONE ENCOUNTER (OUTPATIENT)
Dept: URBAN - METROPOLITAN AREA CLINIC 68 | Facility: CLINIC | Age: 72
End: 2022-06-05

## 2022-06-05 RX ORDER — VALSARTAN 80 MG/1
VALSARTAN( 80MG ORAL 1 TABLET DAILY ) ACTIVE -HX ENTRY TABLET, FILM COATED ORAL DAILY
Status: ACTIVE | COMMUNITY
Start: 2022-03-21

## 2022-06-05 RX ORDER — FLUTICASONE PROPIONATE 50 UG/1
FLUTICASONE PROPIONATE( 50MCG/ACT NASAL  DAILY ) ACTIVE -HX ENTRY SPRAY, METERED NASAL DAILY
Status: ACTIVE | COMMUNITY
Start: 2022-03-21

## 2022-06-05 RX ORDER — EMPAGLIFLOZIN 25 MG/1
JARDIANCE( 25MG ORAL 1/2 DAILY ) ACTIVE -HX ENTRY TABLET, FILM COATED ORAL DAILY
Status: ACTIVE | COMMUNITY
Start: 2022-03-21

## 2022-06-05 RX ORDER — DORZOLAMIDE HCL/PF 2 %
DORZOLAMIDE HCL( 2% OPHTHALMIC 2 DROPS TWICE A DAY ) ACTIVE -HX ENTRY DROPS OPHTHALMIC (EYE) TWICE A DAY
Status: ACTIVE | COMMUNITY
Start: 2022-03-21

## 2022-06-05 RX ORDER — ALBUTEROL SULFATE 90 UG/1
VENTOLIN HFA( 108 (90 BASE)MCG/ACT INHALATION  AS NEEDED ) ACTIVE -HX ENTRY AEROSOL, METERED RESPIRATORY (INHALATION) AS NEEDED
Status: ACTIVE | COMMUNITY
Start: 2022-03-21

## 2022-06-05 RX ORDER — FLUTICASONE FUROATE, UMECLIDINIUM BROMIDE AND VILANTEROL TRIFENATATE 200; 62.5; 25 UG/1; UG/1; UG/1
TRELEGY ELLIPTA( 200-62.5-25MCG/INH INHALATION  PRN ) ACTIVE -HX ENTRY POWDER RESPIRATORY (INHALATION) PRN
Status: ACTIVE | COMMUNITY
Start: 2022-03-21

## 2022-06-05 RX ORDER — SITAGLIPTIN AND METFORMIN HYDROCHLORIDE 50; 1000 MG/1; MG/1
JANUMET( 50-1000MG ORAL  TWICE DAILY ) ACTIVE -HX ENTRY TABLET, FILM COATED ORAL TWICE DAILY
Status: ACTIVE | COMMUNITY
Start: 2022-03-21

## 2022-06-05 RX ORDER — LEVOTHYROXINE SODIUM 125 UG/1
LEVOTHYROXINE SODIUM( 125MCG ORAL 1 DAILY ) ACTIVE -HX ENTRY TABLET ORAL DAILY
Status: ACTIVE | COMMUNITY
Start: 2022-03-21

## 2022-06-05 RX ORDER — IPRATROPIUM BROMIDE AND ALBUTEROL SULFATE 2.5; .5 MG/3ML; MG/3ML
IPRATROPIUM-ALBUTEROL( 0.5-2.5 (3)MG/3ML INHALATION  AS NEEDED ) ACTIVE -HX ENTRY SOLUTION RESPIRATORY (INHALATION) AS NEEDED
Status: ACTIVE | COMMUNITY
Start: 2022-03-21

## 2022-06-05 RX ORDER — NALOXONE HYDROCHLORIDE 4 MG/.1ML
NARCAN( 4MG/0.1ML NASAL  AS NEEDED ) ACTIVE -HX ENTRY SPRAY NASAL AS NEEDED
Status: ACTIVE | COMMUNITY
Start: 2022-03-21

## 2022-06-05 RX ORDER — MORPHINE SULFATE 15 MG
MORPHINE SULFATE( 15MG ORAL 1/2 TABLET EVERY8  HOURS AS NEEDED  ) ACTIVE -HX ENTRY TABLET ORAL
Status: ACTIVE | COMMUNITY
Start: 2022-03-21

## 2022-06-05 RX ORDER — TIZANIDINE HYDROCHLORIDE 4 MG/1
TIZANIDINE HCL( 4MG ORAL 1 EVERY 8 HOURS ) ACTIVE -HX ENTRY CAPSULE ORAL EVERY 8 HOURS
Status: ACTIVE | COMMUNITY
Start: 2022-03-21

## 2022-06-05 RX ORDER — METHYLCELLULOSE 500 MG/1
CITRUCEL( 500MG ORAL 2 AS NEEDED ) ACTIVE -HX ENTRY TABLET ORAL AS NEEDED
Status: ACTIVE | COMMUNITY
Start: 2022-03-21

## 2022-06-05 RX ORDER — CARBOXYMETHYLCELLULOSE SODIUM 10 MG/ML
THERATEARS( 1% OPHTHALMIC 2 GTTS ) ACTIVE -HX ENTRY GEL OPHTHALMIC
Status: ACTIVE | COMMUNITY
Start: 2022-03-21

## 2022-06-05 RX ORDER — FLUTICASONE PROPIONATE 44 UG/1
FLOVENT HFA( 44MCG/ACT INHALATION  AS NEEDED ) ACTIVE -HX ENTRY AEROSOL, METERED RESPIRATORY (INHALATION) AS NEEDED
Status: ACTIVE | COMMUNITY
Start: 2022-03-21

## 2022-06-13 ENCOUNTER — OFFICE VISIT (OUTPATIENT)
Dept: URBAN - METROPOLITAN AREA CLINIC 68 | Facility: CLINIC | Age: 72
End: 2022-06-13

## 2022-06-25 ENCOUNTER — TELEPHONE ENCOUNTER (OUTPATIENT)
Age: 72
End: 2022-06-25

## 2022-06-25 RX ORDER — DEXLANSOPRAZOLE 60 MG/1
CAPSULE, DELAYED RELEASE ORAL DAILY
Qty: 20 | Refills: 0 | OUTPATIENT
Start: 2018-05-23 | End: 2018-06-12

## 2022-06-25 RX ORDER — LISINOPRIL 5 MG/1
LISINOPRIL( 5MG ORAL  DAILY ) DISCONTINUED -HX ENTRY TABLET ORAL DAILY
OUTPATIENT
Start: 2022-03-21 | End: 2022-03-21

## 2022-06-25 RX ORDER — ROSUVASTATIN CALCIUM 10 MG
CRESTOR( 10MG ORAL  DAILY ) INACTIVE -HX ENTRY TABLET ORAL DAILY
OUTPATIENT
Start: 2019-06-11

## 2022-06-25 RX ORDER — BUDESONIDE 9 MG/1
TABLET, EXTENDED RELEASE ORAL DAILY
Qty: 1 | Refills: 0 | OUTPATIENT
Start: 2017-08-07 | End: 2017-08-13

## 2022-06-25 RX ORDER — METOPROLOL SUCCINATE 25 MG/1
METOPROLOL SUCCINATE ER( 25MG ORAL 1 TWICE A DAY ) INACTIVE -HX ENTRY TABLET, EXTENDED RELEASE ORAL TWICE A DAY
OUTPATIENT
Start: 2018-01-04

## 2022-06-25 RX ORDER — HYALURONATE,MOD.,NON-CROSSLINK 24 MG/3 ML
SYRINGE (ML) INTRAARTICULAR
OUTPATIENT
Start: 2019-01-28

## 2022-06-25 RX ORDER — PRAVASTATIN SODIUM 20 MG/1
PRAVASTATIN SODIUM( 20MG ORAL 1 AT BEDTIME ) INACTIVE -HX ENTRY TABLET ORAL AT BEDTIME
OUTPATIENT
Start: 2018-03-15

## 2022-06-25 RX ORDER — ZINC OXIDE 200 MG/G
OINTMENT TOPICAL AS DIRECTED
Qty: 30 | Refills: 0 | OUTPATIENT
Start: 2021-04-21 | End: 2021-05-21

## 2022-06-25 RX ORDER — SULFASALAZINE 500 MG/1
SULFASALAZINE( 500MG ORAL 2 BY MOUTH TWICE EACH DAY.  ) INACTIVE -HX ENTRY TABLET ORAL
OUTPATIENT
Start: 2017-12-14

## 2022-06-25 RX ORDER — LISINOPRIL 5 MG/1
LISINOPRIL( 5MG ORAL  DAILY ) INACTIVE -HX ENTRY TABLET ORAL DAILY
OUTPATIENT
Start: 2019-01-28

## 2022-06-25 RX ORDER — HYDROMORPHONE HYDROCHLORIDE 4 MG/1
HYDROMORPHONE HCL( 4MG ORAL 1 AS NEEDED ) INACTIVE -HX ENTRY TABLET ORAL AS NEEDED
OUTPATIENT
Start: 2021-04-06

## 2022-06-25 RX ORDER — MESALAMINE 1.2 G/1
TABLET, DELAYED RELEASE ORAL DAILY
Qty: 48 | Refills: 48 | OUTPATIENT
Start: 2017-09-28 | End: 2018-01-04

## 2022-06-25 RX ORDER — TAMSULOSIN HCL 0.4 MG
FLOMAX( 0.4MG ORAL  DAILY ) INACTIVE -HX ENTRY CAPSULE ORAL DAILY
OUTPATIENT
Start: 2019-06-11

## 2022-06-25 RX ORDER — LOSARTAN POTASSIUM 25 MG/1
LOSARTAN POTASSIUM( 25MG ORAL  DAILY ) INACTIVE -HX ENTRY TABLET, FILM COATED ORAL DAILY
OUTPATIENT
Start: 2021-07-20

## 2022-06-25 RX ORDER — ROSUVASTATIN CALCIUM 5 MG
CRESTOR( 5MG ORAL  DAILY ) INACTIVE -HX ENTRY TABLET ORAL DAILY
OUTPATIENT
Start: 2021-07-20

## 2022-06-25 RX ORDER — MIRABEGRON 25 MG/1
MYRBETRIQ( 25MG ORAL  DAILY ) INACTIVE -HX ENTRY TABLET, FILM COATED, EXTENDED RELEASE ORAL DAILY
OUTPATIENT
Start: 2021-04-06

## 2022-06-25 RX ORDER — DEXLANSOPRAZOLE 60 MG/1
DEXILANT( 60MG ORAL  DAILY ) INACTIVE -HX ENTRY CAPSULE, DELAYED RELEASE ORAL DAILY
OUTPATIENT
Start: 2020-12-03

## 2022-06-25 RX ORDER — DOCUSATE SODIUM 100 MG/1
COLACE( 100MG ORAL 2 DAILY ) INACTIVE -HX ENTRY CAPSULE ORAL DAILY
OUTPATIENT
Start: 2021-07-20

## 2022-06-25 RX ORDER — LIDOCAINE, MENTHOL, METHYL SALICYLATE, CAMPHOR 4; 3; 9; 1.2 1/1; 1/1; 1/1; 1/1
CBD OIL(   17MG DAILY ) INACTIVE -HX ENTRY PATCH TOPICAL DAILY
OUTPATIENT
Start: 2021-07-20

## 2022-06-25 RX ORDER — ADALIMUMAB 40MG/0.8ML
HUMIRA( 40MG/0.8ML SUBCUTANEOUS  AS DIRECTED ) INACTIVE -HX ENTRY KIT SUBCUTANEOUS AS DIRECTED
OUTPATIENT
Start: 2017-08-31

## 2022-06-25 RX ORDER — GABAPENTIN 100 MG/1
GABAPENTIN( 100MG ORAL 2 2  AT BEDTIME ) DISCONTINUED -HX ENTRY CAPSULE ORAL
OUTPATIENT
Start: 2022-03-21 | End: 2022-03-21

## 2022-06-25 RX ORDER — CANAGLIFLOZIN AND METFORMIN HYDROCHLORIDE 50; 1000 MG/1; MG/1
INVOKAMET XR( 50-1000MG ORAL 2 DAILY ) INACTIVE -HX ENTRY TABLET, FILM COATED, EXTENDED RELEASE ORAL DAILY
OUTPATIENT
Start: 2018-01-04

## 2022-06-25 RX ORDER — HYDROXYCHLOROQUINE SULFATE 200 MG/1
HYDROXYCHLOROQUINE SULFATE( 200MG ORAL 1 TWO TIMES DAILY ) INACTIVE -HX ENTRY TABLET, FILM COATED ORAL
OUTPATIENT
Start: 2021-07-20

## 2022-06-25 RX ORDER — BACLOFEN 10 MG/1
BACLOFEN( 10MG ORAL 1 THREE TIMES DAILY ) INACTIVE -HX ENTRY TABLET ORAL
OUTPATIENT
Start: 2019-12-03

## 2022-06-26 ENCOUNTER — TELEPHONE ENCOUNTER (OUTPATIENT)
Age: 72
End: 2022-06-26

## 2022-06-26 RX ORDER — TIZANIDINE HYDROCHLORIDE 4 MG/1
TIZANIDINE HCL( 4MG ORAL 1 EVERY 8 HOURS ) ACTIVE -HX ENTRY CAPSULE ORAL EVERY 8 HOURS
Status: ACTIVE | COMMUNITY
Start: 2022-06-13

## 2022-06-26 RX ORDER — CARBOXYMETHYLCELLULOSE SODIUM 10 MG/ML
THERATEARS( 1% OPHTHALMIC 2 GTTS ) ACTIVE -HX ENTRY GEL OPHTHALMIC
Status: ACTIVE | COMMUNITY
Start: 2022-06-13

## 2022-06-26 RX ORDER — VALSARTAN 80 MG/1
VALSARTAN( 80MG ORAL 1 TABLET DAILY ) ACTIVE -HX ENTRY TABLET, COATED ORAL DAILY
Status: ACTIVE | COMMUNITY
Start: 2022-06-13

## 2022-06-26 RX ORDER — LATANOPROST/PF 0.005 %
LATANOPROST( 0.005% OPHTHALMIC  AS DIRECTED ) ACTIVE -HX ENTRY DROPS OPHTHALMIC (EYE) AS DIRECTED
Status: ACTIVE | COMMUNITY
Start: 2022-06-13

## 2022-06-26 RX ORDER — SITAGLIPTIN AND METFORMIN HYDROCHLORIDE 1000; 50 MG/1; MG/1
JANUMET( 50-1000MG ORAL  TWICE DAILY ) ACTIVE -HX ENTRY TABLET, FILM COATED ORAL TWICE DAILY
Status: ACTIVE | COMMUNITY
Start: 2022-06-13

## 2022-06-26 RX ORDER — EMPAGLIFLOZIN 25 MG/1
JARDIANCE( 25MG ORAL 1/2 DAILY ) ACTIVE -HX ENTRY TABLET, FILM COATED ORAL DAILY
Status: ACTIVE | COMMUNITY
Start: 2022-06-13

## 2022-06-26 RX ORDER — ALBUTEROL SULFATE 90 UG/1
VENTOLIN HFA( 108 (90 BASE)MCG/ACT INHALATION  AS NEEDED ) ACTIVE -HX ENTRY AEROSOL, METERED RESPIRATORY (INHALATION) AS NEEDED
Status: ACTIVE | COMMUNITY
Start: 2022-06-13

## 2022-06-26 RX ORDER — DORZOLAMIDE HCL/PF 2 %
DORZOLAMIDE HCL( 2% OPHTHALMIC 2 DROPS TWICE A DAY ) ACTIVE -HX ENTRY DROPS OPHTHALMIC (EYE) TWICE A DAY
Status: ACTIVE | COMMUNITY
Start: 2022-03-21

## 2022-06-26 RX ORDER — MORPHINE SULFATE 15 MG
MORPHINE SULFATE( 15MG ORAL 1/2 TABLET EVERY8  HOURS AS NEEDED  ) ACTIVE -HX ENTRY TABLET ORAL
Status: ACTIVE | COMMUNITY
Start: 2022-06-13

## 2022-06-26 RX ORDER — NALOXONE HYDROCHLORIDE 4 MG/.1ML
NARCAN( 4MG/0.1ML NASAL  AS NEEDED ) ACTIVE -HX ENTRY SPRAY NASAL AS NEEDED
Status: ACTIVE | COMMUNITY
Start: 2022-06-13

## 2022-06-26 RX ORDER — LORATADINE 5 MG
TABLET,CHEWABLE ORAL
Qty: 1 | Refills: 0 | Status: ACTIVE | COMMUNITY
Start: 2022-06-13

## 2022-06-26 RX ORDER — LEVOTHYROXINE SODIUM 125 UG/1
LEVOTHYROXINE SODIUM( 125MCG ORAL 1 DAILY ) ACTIVE -HX ENTRY TABLET ORAL DAILY
Status: ACTIVE | COMMUNITY
Start: 2022-06-13

## 2022-06-26 RX ORDER — IPRATROPIUM BROMIDE AND ALBUTEROL SULFATE 2.5; .5 MG/3ML; MG/3ML
IPRATROPIUM-ALBUTEROL( 0.5-2.5 (3)MG/3ML INHALATION  AS NEEDED ) ACTIVE -HX ENTRY SOLUTION RESPIRATORY (INHALATION) AS NEEDED
Status: ACTIVE | COMMUNITY
Start: 2022-06-13

## 2022-06-26 RX ORDER — FLUTICASONE PROPIONATE 50 UG/1
FLUTICASONE PROPIONATE( 50MCG/ACT NASAL  DAILY ) ACTIVE -HX ENTRY SPRAY, METERED NASAL DAILY
Status: ACTIVE | COMMUNITY
Start: 2022-06-13

## 2022-06-26 RX ORDER — METHYLCELLULOSE 500 MG/1
CITRUCEL( 500MG ORAL 2 AS NEEDED ) ACTIVE -HX ENTRY TABLET ORAL AS NEEDED
Status: ACTIVE | COMMUNITY
Start: 2022-06-13

## 2022-06-26 RX ORDER — FLUTICASONE PROPIONATE 44 UG/1
FLOVENT HFA( 44MCG/ACT INHALATION  AS NEEDED ) ACTIVE -HX ENTRY AEROSOL, METERED RESPIRATORY (INHALATION) AS NEEDED
Status: ACTIVE | COMMUNITY
Start: 2022-06-13

## 2022-06-26 RX ORDER — DORZOLAMIDE HCL/PF 2 %
DORZOLAMIDE HCL( 2% OPHTHALMIC   ) ACTIVE -HX ENTRY DROPS OPHTHALMIC (EYE)
Status: ACTIVE | COMMUNITY
Start: 2022-06-13

## 2022-06-26 RX ORDER — ASPIRIN 81 MG/1
ASPIRIN( 325MG ORAL  DAILY ) ACTIVE -HX ENTRY TABLET, COATED ORAL DAILY
Status: ACTIVE | COMMUNITY
Start: 2022-06-13

## 2022-07-09 ENCOUNTER — TELEPHONE ENCOUNTER (OUTPATIENT)
Dept: URBAN - METROPOLITAN AREA CLINIC 121 | Facility: CLINIC | Age: 72
End: 2022-07-09

## 2022-07-09 RX ORDER — FLUTICASONE PROPIONATE 50 UG/1
SPRAY, METERED NASAL
Refills: 0 | OUTPATIENT
Start: 2016-06-28 | End: 2016-08-17

## 2022-07-09 RX ORDER — INSULIN ASPART 100 [IU]/ML
INJECTION, SOLUTION INTRAVENOUS; SUBCUTANEOUS
Refills: 0 | OUTPATIENT
Start: 2017-02-08 | End: 2017-03-08

## 2022-07-09 RX ORDER — SITAGLIPTIN AND METFORMIN HYDROCHLORIDE 1000; 50 MG/1; MG/1
TABLET, FILM COATED ORAL TWICE A DAY
Refills: 0 | OUTPATIENT
Start: 2016-03-16 | End: 2016-05-11

## 2022-07-09 RX ORDER — ABATACEPT 250 MG/15ML
1.000 ONCE A MONTH INJECTION, POWDER, LYOPHILIZED, FOR SOLUTION INTRAVENOUS
Refills: 0 | OUTPATIENT
Start: 2016-06-28 | End: 2016-06-28

## 2022-07-09 RX ORDER — INSULIN ASPART 100 [IU]/ML
INJECTION, SOLUTION INTRAVENOUS; SUBCUTANEOUS ONCE A DAY
Refills: 0 | OUTPATIENT
Start: 2016-01-25 | End: 2016-02-01

## 2022-07-09 RX ORDER — MESALAMINE 4 G/60ML
ONCE A DAY SUSPENSION RECTAL ONCE A DAY
Refills: 0 | OUTPATIENT
Start: 2017-01-11 | End: 2017-02-08

## 2022-07-09 RX ORDER — FLUTICASONE PROPIONATE 44 UG/1
PRN AEROSOL, METERED RESPIRATORY (INHALATION)
Refills: 0 | OUTPATIENT
Start: 2017-01-11 | End: 2017-02-08

## 2022-07-09 RX ORDER — HYDROCORTISONE 25 MG/G
PRN CREAM TOPICAL
Refills: 0 | OUTPATIENT
Start: 2016-06-28 | End: 2016-08-17

## 2022-07-09 RX ORDER — INSULIN ASPART 100 [IU]/ML
INJECTION, SOLUTION INTRAVENOUS; SUBCUTANEOUS
Refills: 0 | OUTPATIENT
Start: 2014-10-10 | End: 2015-12-22

## 2022-07-09 RX ORDER — METHOTREXATE 2.5 MG/1
TABLET ORAL
Refills: 0 | OUTPATIENT
Start: 2014-10-10 | End: 2015-12-22

## 2022-07-09 RX ORDER — FLUTICASONE PROPIONATE 50 UG/1
SPRAY, METERED NASAL
Refills: 0 | OUTPATIENT
Start: 2016-06-08 | End: 2016-06-28

## 2022-07-09 RX ORDER — MESALAMINE 4 G/60ML
ONCE A DAY ENEMA RECTAL ONCE A DAY
Refills: 0 | OUTPATIENT
Start: 2017-02-08 | End: 2017-03-08

## 2022-07-09 RX ORDER — FLUTICASONE PROPIONATE 50 UG/1
SPRAY, METERED NASAL
Refills: 0 | OUTPATIENT
Start: 2016-02-08 | End: 2016-03-16

## 2022-07-09 RX ORDER — METOPROLOL SUCCINATE 25 MG/1
TABLET, EXTENDED RELEASE ORAL TWICE A DAY
Refills: 0 | OUTPATIENT
Start: 2017-01-11 | End: 2017-02-08

## 2022-07-09 RX ORDER — FOLIC ACID 1 MG/1
TABLET ORAL
Refills: 0 | OUTPATIENT
Start: 2014-10-10 | End: 2015-12-22

## 2022-07-09 RX ORDER — LEVOTHYROXINE SODIUM 125 UG/1
TABLET ORAL ONCE A DAY
Refills: 0 | OUTPATIENT
Start: 2016-01-25 | End: 2016-02-01

## 2022-07-09 RX ORDER — BUDESONIDE 9 MG/1
TABLET, EXTENDED RELEASE ORAL
Refills: 0 | OUTPATIENT
Start: 2016-08-17 | End: 2016-09-07

## 2022-07-09 RX ORDER — PREDNISONE 5 MG/1
TABLET ORAL THREE TIMES A DAY
Refills: 0 | OUTPATIENT
Start: 2015-12-22 | End: 2016-01-25

## 2022-07-09 RX ORDER — BIMATOPROST 0.1 MG/ML
SOLUTION/ DROPS OPHTHALMIC
Refills: 0 | OUTPATIENT
Start: 2014-10-10 | End: 2015-12-22

## 2022-07-09 RX ORDER — HYDROMORPHONE HYDROCHLORIDE 2 MG/1
TABLET ORAL
Refills: 0 | OUTPATIENT
Start: 2012-11-08 | End: 2013-09-10

## 2022-07-09 RX ORDER — ABATACEPT 250 MG/15ML
1.000 ONCE A MONTH INJECTION, POWDER, LYOPHILIZED, FOR SOLUTION INTRAVENOUS
Refills: 0 | OUTPATIENT
Start: 2015-12-22 | End: 2016-01-25

## 2022-07-09 RX ORDER — SITAGLIPTIN AND METFORMIN HYDROCHLORIDE 1000; 50 MG/1; MG/1
TABLET, FILM COATED ORAL TWICE A DAY
Refills: 0 | OUTPATIENT
Start: 2016-06-08 | End: 2016-06-28

## 2022-07-09 RX ORDER — PREDNISONE 5 MG/1
TABLET ORAL
Refills: 0 | OUTPATIENT
Start: 2012-11-08 | End: 2013-11-18

## 2022-07-09 RX ORDER — ADALIMUMAB 40MG/0.8ML
KIT SUBCUTANEOUS TWICE A DAY
Refills: 0 | OUTPATIENT
Start: 2016-12-07 | End: 2016-12-07

## 2022-07-09 RX ORDER — NEOMYCIN/BACIT/P-MYX/HYDROCORT 3.5-10K-1
OINTMENT (GRAM) OPHTHALMIC (EYE)
Refills: 0 | OUTPATIENT
Start: 2016-12-07 | End: 2017-01-11

## 2022-07-09 RX ORDER — DIAZEPAM 2 MG/1
TABLET ORAL
Refills: 0 | OUTPATIENT
Start: 2016-02-01 | End: 2016-02-08

## 2022-07-09 RX ORDER — HYDROCORTISONE 25 MG/G
PRN CREAM TOPICAL
Refills: 0 | OUTPATIENT
Start: 2016-08-17 | End: 2016-09-07

## 2022-07-09 RX ORDER — TAMSULOSIN HYDROCHLORIDE 0.4 MG/1
CAPSULE ORAL ONCE A DAY
Refills: 0 | OUTPATIENT
Start: 2016-01-25 | End: 2016-02-01

## 2022-07-09 RX ORDER — MESALAMINE 1.2 G/1
4 TAB DAILY TABLET, DELAYED RELEASE ORAL
Refills: 0 | OUTPATIENT
Start: 2016-02-08 | End: 2016-03-16

## 2022-07-09 RX ORDER — FLUTICASONE PROPIONATE 44 UG/1
PRN AEROSOL, METERED RESPIRATORY (INHALATION)
Refills: 0 | OUTPATIENT
Start: 2016-12-07 | End: 2017-01-11

## 2022-07-09 RX ORDER — ABATACEPT 250 MG/15ML
1.000 ONCE A MONTH INJECTION, POWDER, LYOPHILIZED, FOR SOLUTION INTRAVENOUS
Refills: 0 | OUTPATIENT
Start: 2016-03-16 | End: 2016-05-11

## 2022-07-09 RX ORDER — ADALIMUMAB 40MG/0.8ML
1 TAB TWICE DAY KIT SUBCUTANEOUS
Refills: 0 | OUTPATIENT
Start: 2017-03-08 | End: 2017-03-08

## 2022-07-09 RX ORDER — ADALIMUMAB 40MG/0.8ML
1 TAB TWICE DAY KIT SUBCUTANEOUS
Refills: 0 | OUTPATIENT
Start: 2017-01-11 | End: 2017-02-08

## 2022-07-09 RX ORDER — METOPROLOL SUCCINATE 50 MG/1
TABLET, EXTENDED RELEASE ORAL
Refills: 0 | OUTPATIENT
Start: 2012-11-08 | End: 2013-11-18

## 2022-07-09 RX ORDER — LEVOTHYROXINE SODIUM 125 UG/1
TABLET ORAL ONCE A DAY
Refills: 0 | OUTPATIENT
Start: 2016-12-07 | End: 2017-01-11

## 2022-07-09 RX ORDER — LEVOTHYROXINE SODIUM 125 UG/1
TABLET ORAL ONCE A DAY
Refills: 0 | OUTPATIENT
Start: 2017-02-08 | End: 2017-03-08

## 2022-07-09 RX ORDER — TAMSULOSIN HYDROCHLORIDE 0.4 MG/1
CAPSULE ORAL ONCE A DAY
Refills: 0 | OUTPATIENT
Start: 2015-12-22 | End: 2016-01-25

## 2022-07-09 RX ORDER — INSULIN ASPART 100 [IU]/ML
INJECTION, SOLUTION INTRAVENOUS; SUBCUTANEOUS
Refills: 0 | OUTPATIENT
Start: 2017-01-11 | End: 2017-02-08

## 2022-07-09 RX ORDER — LEVOTHYROXINE SODIUM 125 UG/1
TABLET ORAL ONCE A DAY
Refills: 0 | OUTPATIENT
Start: 2016-09-07 | End: 2016-10-19

## 2022-07-09 RX ORDER — NEOMYCIN/BACIT/P-MYX/HYDROCORT 3.5-10K-1
OINTMENT (GRAM) OPHTHALMIC (EYE)
Refills: 0 | OUTPATIENT
Start: 2017-02-08 | End: 2017-03-08

## 2022-07-09 RX ORDER — FLUTICASONE PROPIONATE 50 UG/1
SPRAY, METERED NASAL
Refills: 0 | OUTPATIENT
Start: 2014-10-10 | End: 2015-12-22

## 2022-07-09 RX ORDER — FLUTICASONE PROPIONATE 100 UG/1
POWDER, METERED RESPIRATORY (INHALATION)
Refills: 0 | OUTPATIENT
Start: 2012-11-08 | End: 2013-11-18

## 2022-07-09 RX ORDER — BUDESONIDE 9 MG/1
USE AS DIRECTED TABLET, EXTENDED RELEASE ORAL
Refills: 0 | OUTPATIENT
Start: 2016-06-08 | End: 2016-06-28

## 2022-07-09 RX ORDER — INSULIN ASPART 100 [IU]/ML
INJECTION, SOLUTION INTRAVENOUS; SUBCUTANEOUS
Refills: 0 | OUTPATIENT
Start: 2016-09-07 | End: 2016-10-19

## 2022-07-09 RX ORDER — HYDROCORTISONE 25 MG/G
PRN CREAM TOPICAL
Refills: 0 | OUTPATIENT
Start: 2016-09-07 | End: 2016-10-19

## 2022-07-09 RX ORDER — DIAZEPAM 5 MG/1
TABLET ORAL
Refills: 0 | OUTPATIENT
Start: 2013-11-18 | End: 2014-10-10

## 2022-07-09 RX ORDER — LOSARTAN POTASSIUM 100 MG/1
TABLET, FILM COATED ORAL
Refills: 0 | OUTPATIENT
Start: 2014-10-10 | End: 2015-12-22

## 2022-07-09 RX ORDER — ABATACEPT 250 MG/15ML
1.000 ONCE A MONTH INJECTION, POWDER, LYOPHILIZED, FOR SOLUTION INTRAVENOUS
Refills: 0 | OUTPATIENT
Start: 2016-03-16 | End: 2016-03-16

## 2022-07-09 RX ORDER — FLUTICASONE PROPIONATE 50 UG/1
SPRAY, METERED NASAL
Refills: 0 | OUTPATIENT
Start: 2016-10-19 | End: 2016-12-07

## 2022-07-09 RX ORDER — SITAGLIPTIN AND METFORMIN HYDROCHLORIDE 1000; 50 MG/1; MG/1
TABLET, FILM COATED ORAL
Refills: 0 | OUTPATIENT
Start: 2014-10-10 | End: 2015-12-22

## 2022-07-09 RX ORDER — LANSOPRAZOLE 30 MG/1
CAPSULE, DELAYED RELEASE ORAL
Refills: 0 | OUTPATIENT
Start: 2012-11-08 | End: 2013-11-18

## 2022-07-09 RX ORDER — METOPROLOL SUCCINATE 25 MG/1
TABLET, EXTENDED RELEASE ORAL TWICE A DAY
Refills: 0 | OUTPATIENT
Start: 2017-02-08 | End: 2017-03-08

## 2022-07-09 RX ORDER — HYDROCORTISONE 25 MG/G
PRN CREAM TOPICAL
Refills: 0 | OUTPATIENT
Start: 2016-10-19 | End: 2016-12-07

## 2022-07-09 RX ORDER — MESALAMINE 1.2 G/1
TAKE 4 TABLETS ONCE A DAY TABLET, DELAYED RELEASE ORAL ONCE A DAY
Refills: 3 | OUTPATIENT
Start: 2016-01-25 | End: 2016-02-08

## 2022-07-09 RX ORDER — METOPROLOL SUCCINATE 25 MG/1
TABLET, EXTENDED RELEASE ORAL TWICE A DAY
Refills: 0 | OUTPATIENT
Start: 2016-06-28 | End: 2016-08-17

## 2022-07-09 RX ORDER — SITAGLIPTIN AND METFORMIN HYDROCHLORIDE 1000; 50 MG/1; MG/1
TABLET, FILM COATED, EXTENDED RELEASE ORAL TWICE A DAY
Refills: 0 | OUTPATIENT
Start: 2016-08-17 | End: 2016-09-07

## 2022-07-09 RX ORDER — FLUTICASONE PROPIONATE 50 UG/1
SPRAY, METERED NASAL
Refills: 0 | OUTPATIENT
Start: 2016-12-07 | End: 2017-01-11

## 2022-07-09 RX ORDER — BUDESONIDE 9 MG/1
TABLET, EXTENDED RELEASE ORAL
Refills: 0 | OUTPATIENT
Start: 2016-03-16 | End: 2016-05-11

## 2022-07-09 RX ORDER — MESALAMINE 1.2 G/1
4 TAB DAILY TABLET, DELAYED RELEASE ORAL
Refills: 0 | OUTPATIENT
Start: 2016-05-11 | End: 2016-05-11

## 2022-07-09 RX ORDER — TAMSULOSIN HYDROCHLORIDE 0.4 MG/1
CAPSULE ORAL ONCE A DAY
Refills: 0 | OUTPATIENT
Start: 2016-06-28 | End: 2016-08-17

## 2022-07-09 RX ORDER — FLUTICASONE PROPIONATE 50 UG/1
SPRAY, METERED NASAL
Refills: 0 | OUTPATIENT
Start: 2016-01-25 | End: 2016-02-01

## 2022-07-09 RX ORDER — SITAGLIPTIN AND METFORMIN HYDROCHLORIDE 1000; 50 MG/1; MG/1
TABLET, FILM COATED, EXTENDED RELEASE ORAL TWICE A DAY
Refills: 0 | OUTPATIENT
Start: 2016-10-19 | End: 2016-12-07

## 2022-07-09 RX ORDER — PREDNISONE 5 MG/1
TABLET ORAL
Refills: 0 | OUTPATIENT
Start: 2013-11-18 | End: 2014-10-10

## 2022-07-09 RX ORDER — LEVOTHYROXINE SODIUM 125 UG/1
TABLET ORAL ONCE A DAY
Refills: 0 | OUTPATIENT
Start: 2016-06-08 | End: 2016-06-28

## 2022-07-09 RX ORDER — METOPROLOL SUCCINATE 25 MG/1
TABLET, EXTENDED RELEASE ORAL TWICE A DAY
Refills: 0 | OUTPATIENT
Start: 2016-03-16 | End: 2016-06-08

## 2022-07-09 RX ORDER — OMEPRAZOLE 40 MG/1
TAKE ONE CAPSULE BY MOUTH DAILY CAPSULE, DELAYED RELEASE ORAL
Refills: 2 | OUTPATIENT
Start: 2015-12-28 | End: 2016-01-25

## 2022-07-09 RX ORDER — LATANOPROST/PF 0.005 %
DROPS OPHTHALMIC (EYE)
Refills: 0 | OUTPATIENT
Start: 2015-12-22 | End: 2016-01-25

## 2022-07-09 RX ORDER — BUDESONIDE 9 MG/1
TABLET, EXTENDED RELEASE ORAL
Refills: 0 | OUTPATIENT
Start: 2016-05-11 | End: 2016-06-08

## 2022-07-09 RX ORDER — DIAZEPAM 2 MG/1
TABLET ORAL
Refills: 0 | OUTPATIENT
Start: 2015-12-22 | End: 2016-01-25

## 2022-07-09 RX ORDER — MESALAMINE 4 G/60ML
USE ONE ENEMA QHS ENEMA RECTAL
Refills: 2 | OUTPATIENT
Start: 2013-01-16 | End: 2014-10-10

## 2022-07-09 RX ORDER — ABATACEPT 250 MG/15ML
1.000 ONCE A MONTH INJECTION, POWDER, LYOPHILIZED, FOR SOLUTION INTRAVENOUS
Refills: 0 | OUTPATIENT
Start: 2016-02-08 | End: 2016-03-16

## 2022-07-09 RX ORDER — SITAGLIPTIN AND METFORMIN HYDROCHLORIDE 1000; 50 MG/1; MG/1
TABLET, FILM COATED, EXTENDED RELEASE ORAL TWICE A DAY
Refills: 0 | OUTPATIENT
Start: 2016-12-07 | End: 2017-01-11

## 2022-07-09 RX ORDER — MESALAMINE 1.2 G/1
4 TAB DAILY TABLET, DELAYED RELEASE ORAL
Refills: 0 | OUTPATIENT
Start: 2016-03-16 | End: 2016-05-11

## 2022-07-09 RX ORDER — LATANOPROST/PF 0.005 %
DROPS OPHTHALMIC (EYE)
Refills: 0 | OUTPATIENT
Start: 2016-01-25 | End: 2016-02-01

## 2022-07-09 RX ORDER — INSULIN ASPART 100 [IU]/ML
INJECTION, SOLUTION INTRAVENOUS; SUBCUTANEOUS
Refills: 0 | OUTPATIENT
Start: 2016-08-17 | End: 2016-09-07

## 2022-07-09 RX ORDER — BUDESONIDE 9 MG/1
TABLET, EXTENDED RELEASE ORAL
Refills: 0 | OUTPATIENT
Start: 2016-06-28 | End: 2016-09-07

## 2022-07-09 RX ORDER — HYDROCORTISONE 25 MG/G
TWICE A DAY CREAM TOPICAL TWICE A DAY
Refills: 2 | OUTPATIENT
Start: 2016-05-12 | End: 2016-06-28

## 2022-07-09 RX ORDER — PREDNISONE 10 MG/1
TABLET ORAL
Refills: 0 | OUTPATIENT
Start: 2016-12-07 | End: 2017-01-11

## 2022-07-09 RX ORDER — MESALAMINE 1.2 G/1
TABLET, DELAYED RELEASE ORAL
Refills: 0 | OUTPATIENT
Start: 2016-12-07 | End: 2017-01-11

## 2022-07-09 RX ORDER — MESALAMINE 1.2 G/1
TAKE AS DIRECTED TABLET, DELAYED RELEASE ORAL TAKE AS DIRECTED
Refills: 0 | OUTPATIENT
Start: 2016-05-11 | End: 2016-06-28

## 2022-07-09 RX ORDER — LEVOTHYROXINE SODIUM 125 UG/1
TABLET ORAL ONCE A DAY
Refills: 0 | OUTPATIENT
Start: 2017-01-11 | End: 2017-02-08

## 2022-07-09 RX ORDER — INSULIN ASPART 100 [IU]/ML
INJECTION, SOLUTION INTRAVENOUS; SUBCUTANEOUS ONCE A DAY
Refills: 0 | OUTPATIENT
Start: 2016-02-01 | End: 2016-02-08

## 2022-07-09 RX ORDER — MESALAMINE 1.2 G/1
TABLET, DELAYED RELEASE ORAL
Refills: 0 | OUTPATIENT
Start: 2016-10-19 | End: 2016-12-07

## 2022-07-09 RX ORDER — MESALAMINE 1.2 G/1
TABLET, DELAYED RELEASE ORAL
Refills: 0 | OUTPATIENT
Start: 2016-08-17 | End: 2016-09-07

## 2022-07-09 RX ORDER — LEVOTHYROXINE SODIUM 125 UG/1
TABLET ORAL ONCE A DAY
Refills: 0 | OUTPATIENT
Start: 2016-08-17 | End: 2016-09-07

## 2022-07-09 RX ORDER — METOPROLOL SUCCINATE 25 MG/1
TABLET, EXTENDED RELEASE ORAL TWICE A DAY
Refills: 0 | OUTPATIENT
Start: 2016-12-07 | End: 2017-01-11

## 2022-07-09 RX ORDER — METOPROLOL SUCCINATE 25 MG/1
TABLET, EXTENDED RELEASE ORAL TWICE A DAY
Refills: 0 | OUTPATIENT
Start: 2015-12-22 | End: 2016-01-25

## 2022-07-09 RX ORDER — SITAGLIPTIN AND METFORMIN HYDROCHLORIDE 1000; 50 MG/1; MG/1
TABLET, FILM COATED, EXTENDED RELEASE ORAL TWICE A DAY
Refills: 0 | OUTPATIENT
Start: 2017-01-11 | End: 2017-02-08

## 2022-07-09 RX ORDER — PREDNISONE 5 MG/1
TABLET ORAL THREE TIMES A DAY
Refills: 0 | OUTPATIENT
Start: 2016-02-01 | End: 2016-02-08

## 2022-07-09 RX ORDER — MESALAMINE 1.2 G/1
TABLET, DELAYED RELEASE ORAL
Refills: 0 | OUTPATIENT
Start: 2017-01-11 | End: 2017-02-08

## 2022-07-09 RX ORDER — OMEPRAZOLE 40 MG/1
QD CAPSULE, DELAYED RELEASE ORAL
Refills: 0 | OUTPATIENT
Start: 2013-09-10 | End: 2013-10-24

## 2022-07-09 RX ORDER — LEVOTHYROXINE SODIUM 125 UG/1
TABLET ORAL ONCE A DAY
Refills: 0 | OUTPATIENT
Start: 2017-03-08 | End: 2017-03-08

## 2022-07-09 RX ORDER — TAMSULOSIN HYDROCHLORIDE 0.4 MG/1
CAPSULE ORAL ONCE A DAY
Refills: 0 | OUTPATIENT
Start: 2016-05-11 | End: 2016-06-08

## 2022-07-09 RX ORDER — FLUTICASONE PROPIONATE 50 UG/1
SPRAY, METERED NASAL
Refills: 0 | OUTPATIENT
Start: 2015-12-22 | End: 2016-01-25

## 2022-07-09 RX ORDER — FLUTICASONE PROPIONATE 50 UG/1
SPRAY, METERED NASAL
Refills: 0 | OUTPATIENT
Start: 2016-05-11 | End: 2016-06-08

## 2022-07-09 RX ORDER — MESALAMINE 4 G/60ML
ONCE A DAY SUSPENSION RECTAL ONCE A DAY
Refills: 0 | OUTPATIENT
Start: 2016-05-12 | End: 2016-06-28

## 2022-07-09 RX ORDER — FLUTICASONE PROPIONATE 50 UG/1
SPRAY, METERED NASAL
Refills: 0 | OUTPATIENT
Start: 2016-03-16 | End: 2016-05-11

## 2022-07-09 RX ORDER — SULFASALAZINE 500 MG/1
TAKE AS DIRECTEDTAKE 2 TABLETS 3 TIMES A DAY TABLET ORAL TAKE AS DIRECTED
Refills: 1 | OUTPATIENT
Start: 2016-02-08 | End: 2016-06-28

## 2022-07-09 RX ORDER — SITAGLIPTIN AND METFORMIN HYDROCHLORIDE 1000; 50 MG/1; MG/1
TABLET, FILM COATED ORAL TWICE A DAY
Refills: 0 | OUTPATIENT
Start: 2016-01-25 | End: 2016-02-01

## 2022-07-09 RX ORDER — MESALAMINE 1000 MG/1
ONCE A DAY SUPPOSITORY RECTAL ONCE A DAY
Refills: 0 | OUTPATIENT
Start: 2016-01-25 | End: 2016-02-08

## 2022-07-09 RX ORDER — DIAZEPAM 5 MG/1
TABLET ORAL
Refills: 0 | OUTPATIENT
Start: 2012-11-08 | End: 2013-11-18

## 2022-07-09 RX ORDER — FLUTICASONE PROPIONATE 100 UG/1
POWDER, METERED RESPIRATORY (INHALATION)
Refills: 0 | OUTPATIENT
Start: 2013-11-18 | End: 2014-10-10

## 2022-07-09 RX ORDER — INSULIN ASPART 100 [IU]/ML
INJECTION, SOLUTION INTRAVENOUS; SUBCUTANEOUS
Refills: 0 | OUTPATIENT
Start: 2016-10-19 | End: 2016-12-07

## 2022-07-09 RX ORDER — FLUTICASONE PROPIONATE 50 UG/1
SPRAY, METERED NASAL
Refills: 0 | OUTPATIENT
Start: 2016-02-01 | End: 2016-02-08

## 2022-07-09 RX ORDER — FLUTICASONE PROPIONATE 50 UG/1
SPRAY, METERED NASAL
Refills: 0 | OUTPATIENT
Start: 2016-09-07 | End: 2016-10-19

## 2022-07-09 RX ORDER — METOPROLOL SUCCINATE 25 MG/1
TABLET, EXTENDED RELEASE ORAL TWICE A DAY
Refills: 0 | OUTPATIENT
Start: 2016-01-25 | End: 2016-02-01

## 2022-07-09 RX ORDER — MESALAMINE 4 G/60ML
USE ONE ENEMA QHS X 4-8 WEEKS ENEMA RECTAL
Refills: 1 | OUTPATIENT
Start: 2015-12-22 | End: 2016-01-25

## 2022-07-09 RX ORDER — METOPROLOL SUCCINATE 25 MG/1
TABLET, EXTENDED RELEASE ORAL TWICE A DAY
Refills: 0 | OUTPATIENT
Start: 2016-06-08 | End: 2016-06-28

## 2022-07-09 RX ORDER — SITAGLIPTIN AND METFORMIN HYDROCHLORIDE 1000; 50 MG/1; MG/1
TABLET, FILM COATED, EXTENDED RELEASE ORAL TWICE A DAY
Refills: 0 | OUTPATIENT
Start: 2016-09-07 | End: 2016-10-19

## 2022-07-09 RX ORDER — HYDROCORTISONE 100 MG/60ML
1 QD FOR 2 WEEKS THEN ONE QOD ENEMA RECTAL
Refills: 1 | OUTPATIENT
Start: 2013-01-02 | End: 2013-01-02

## 2022-07-09 RX ORDER — ABATACEPT 250 MG/15ML
1.000 ONCE A MONTH INJECTION, POWDER, LYOPHILIZED, FOR SOLUTION INTRAVENOUS
Refills: 0 | OUTPATIENT
Start: 2016-05-11 | End: 2016-06-08

## 2022-07-09 RX ORDER — ADALIMUMAB 40MG/0.8ML
1 TAB TWICE DAY KIT SUBCUTANEOUS
Refills: 0 | OUTPATIENT
Start: 2017-02-08 | End: 2017-03-08

## 2022-07-09 RX ORDER — OMEPRAZOLE 40 MG/1
QD CAPSULE, DELAYED RELEASE ORAL
Refills: 0 | OUTPATIENT
Start: 2013-10-24 | End: 2014-10-10

## 2022-07-09 RX ORDER — LATANOPROST/PF 0.005 %
DROPS OPHTHALMIC (EYE)
Refills: 0 | OUTPATIENT
Start: 2016-02-01 | End: 2016-02-08

## 2022-07-09 RX ORDER — BUDESONIDE 9 MG/1
TABLET, EXTENDED RELEASE ORAL
Refills: 0 | OUTPATIENT
Start: 2016-09-07 | End: 2016-10-19

## 2022-07-09 RX ORDER — SITAGLIPTIN AND METFORMIN HYDROCHLORIDE 1000; 50 MG/1; MG/1
TABLET, FILM COATED ORAL
Refills: 0 | OUTPATIENT
Start: 2012-11-08 | End: 2013-11-18

## 2022-07-09 RX ORDER — PREDNISONE 10 MG/1
ONE A DAY FOR 2 WEEKS THEN 1/2 A DAY TABLET ORAL
Refills: 0 | OUTPATIENT
Start: 2013-01-02 | End: 2013-01-02

## 2022-07-09 RX ORDER — METOPROLOL SUCCINATE 50 MG/1
TABLET, EXTENDED RELEASE ORAL
Refills: 0 | OUTPATIENT
Start: 2014-10-10 | End: 2015-12-22

## 2022-07-09 RX ORDER — LEVOTHYROXINE SODIUM 125 UG/1
TABLET ORAL ONCE A DAY
Refills: 0 | OUTPATIENT
Start: 2016-03-16 | End: 2016-06-08

## 2022-07-09 RX ORDER — ABATACEPT 250 MG/15ML
1.000 ONCE A MONTH INJECTION, POWDER, LYOPHILIZED, FOR SOLUTION INTRAVENOUS
Refills: 0 | OUTPATIENT
Start: 2016-01-25 | End: 2016-02-01

## 2022-07-09 RX ORDER — LEVOTHYROXINE SODIUM 125 UG/1
TABLET ORAL
Refills: 0 | OUTPATIENT
Start: 2013-09-26 | End: 2014-10-10

## 2022-07-09 RX ORDER — MESALAMINE 4 G/60ML
ONCE A DAY ENEMA RECTAL ONCE A DAY
Refills: 0 | OUTPATIENT
Start: 2016-10-19 | End: 2016-12-07

## 2022-07-09 RX ORDER — METOPROLOL SUCCINATE 25 MG/1
TABLET, EXTENDED RELEASE ORAL TWICE A DAY
Refills: 0 | OUTPATIENT
Start: 2016-02-08 | End: 2016-03-16

## 2022-07-09 RX ORDER — LEVOTHYROXINE SODIUM 125 UG/1
TABLET ORAL ONCE A DAY
Refills: 0 | OUTPATIENT
Start: 2016-02-01 | End: 2016-02-08

## 2022-07-09 RX ORDER — MESALAMINE 4 G/60ML
ONCE A DAY SUSPENSION RECTAL ONCE A DAY
Refills: 0 | OUTPATIENT
Start: 2016-05-12 | End: 2016-05-11

## 2022-07-09 RX ORDER — ADALIMUMAB 40MG/0.8ML
1 TAB TWICE DAY KIT SUBCUTANEOUS
Refills: 0 | OUTPATIENT
Start: 2016-12-07 | End: 2017-01-11

## 2022-07-09 RX ORDER — MESALAMINE 4 G/60ML
ONCE A DAY SUSPENSION RECTAL ONCE A DAY
Refills: 0 | OUTPATIENT
Start: 2016-06-28 | End: 2016-08-17

## 2022-07-09 RX ORDER — PREDNISONE 10 MG/1
TABLET ORAL
Refills: 0 | OUTPATIENT
Start: 2017-01-11 | End: 2017-02-08

## 2022-07-09 RX ORDER — LEVOTHYROXINE SODIUM 125 MCG
TABLET ORAL
Refills: 0 | OUTPATIENT
Start: 2012-11-08 | End: 2013-11-18

## 2022-07-09 RX ORDER — SITAGLIPTIN AND METFORMIN HYDROCHLORIDE 1000; 50 MG/1; MG/1
TABLET, FILM COATED ORAL TWICE A DAY
Refills: 0 | OUTPATIENT
Start: 2016-05-11 | End: 2016-06-08

## 2022-07-09 RX ORDER — LEVOTHYROXINE SODIUM 125 UG/1
TABLET ORAL ONCE A DAY
Refills: 0 | OUTPATIENT
Start: 2016-10-19 | End: 2016-12-07

## 2022-07-09 RX ORDER — SITAGLIPTIN AND METFORMIN HYDROCHLORIDE 1000; 50 MG/1; MG/1
TABLET, FILM COATED ORAL
Refills: 0 | OUTPATIENT
Start: 2013-11-18 | End: 2014-10-10

## 2022-07-09 RX ORDER — HYDROMORPHONE HYDROCHLORIDE 4 MG/1
TABLET ORAL
Refills: 0 | OUTPATIENT
Start: 2014-10-10 | End: 2015-12-22

## 2022-07-09 RX ORDER — BUDESONIDE 9 MG/1
TABLET, EXTENDED RELEASE ORAL
Refills: 0 | OUTPATIENT
Start: 2016-06-08 | End: 2016-06-08

## 2022-07-09 RX ORDER — SITAGLIPTIN AND METFORMIN HYDROCHLORIDE 1000; 50 MG/1; MG/1
TABLET, FILM COATED ORAL TWICE A DAY
Refills: 0 | OUTPATIENT
Start: 2016-02-08 | End: 2016-03-16

## 2022-07-09 RX ORDER — PREDNISONE 5 MG/1
TABLET ORAL THREE TIMES A DAY
Refills: 0 | OUTPATIENT
Start: 2016-01-25 | End: 2016-02-01

## 2022-07-09 RX ORDER — ESOMEPRAZOLE MAGNESIUM 40 MG
TAKE ONE CAPSULE BY MOUTH DAILY CAPSULE,DELAYED RELEASE (ENTERIC COATED) ORAL
Refills: 2 | OUTPATIENT
Start: 2015-12-22 | End: 2016-01-25

## 2022-07-09 RX ORDER — SITAGLIPTIN AND METFORMIN HYDROCHLORIDE 1000; 50 MG/1; MG/1
TABLET, FILM COATED, EXTENDED RELEASE ORAL TWICE A DAY
Refills: 0 | OUTPATIENT
Start: 2017-02-08 | End: 2017-03-08

## 2022-07-09 RX ORDER — MESALAMINE 1.2 G/1
TABLET, DELAYED RELEASE ORAL
Refills: 0 | OUTPATIENT
Start: 2016-06-28 | End: 2016-08-17

## 2022-07-09 RX ORDER — LEVOTHYROXINE SODIUM 125 UG/1
TABLET ORAL ONCE A DAY
Refills: 0 | OUTPATIENT
Start: 2016-02-08 | End: 2016-03-16

## 2022-07-09 RX ORDER — HYDROCORTISONE 25 MG/G
TWICE A DAY CREAM TOPICAL TWICE A DAY
Refills: 2 | OUTPATIENT
Start: 2016-05-12 | End: 2016-05-11

## 2022-07-09 RX ORDER — TAMSULOSIN HYDROCHLORIDE 0.4 MG/1
CAPSULE ORAL ONCE A DAY
Refills: 0 | OUTPATIENT
Start: 2016-02-01 | End: 2016-02-08

## 2022-07-09 RX ORDER — MESALAMINE 1.2 G/1
TABLET, DELAYED RELEASE ORAL
Refills: 0 | OUTPATIENT
Start: 2016-09-07 | End: 2016-10-19

## 2022-07-09 RX ORDER — INSULIN ASPART 100 [IU]/ML
INJECTION, SOLUTION INTRAVENOUS; SUBCUTANEOUS
Refills: 0 | OUTPATIENT
Start: 2012-11-08 | End: 2013-10-24

## 2022-07-09 RX ORDER — INSULIN LISPRO 100 [IU]/ML
INJECTION, SOLUTION INTRAVENOUS; SUBCUTANEOUS
Refills: 0 | OUTPATIENT
Start: 2013-10-24 | End: 2014-10-10

## 2022-07-09 RX ORDER — SITAGLIPTIN AND METFORMIN HYDROCHLORIDE 1000; 50 MG/1; MG/1
TABLET, FILM COATED ORAL TWICE A DAY
Refills: 0 | OUTPATIENT
Start: 2016-02-01 | End: 2016-02-08

## 2022-07-09 RX ORDER — LEVOTHYROXINE SODIUM 125 MCG
TABLET ORAL
Refills: 0 | OUTPATIENT
Start: 2013-11-18 | End: 2014-10-10

## 2022-07-09 RX ORDER — LOSARTAN POTASSIUM 100 MG/1
TABLET, FILM COATED ORAL
Refills: 0 | OUTPATIENT
Start: 2012-11-08 | End: 2013-09-10

## 2022-07-09 RX ORDER — ABATACEPT 250 MG/15ML
1.000 ONCE A MONTH INJECTION, POWDER, LYOPHILIZED, FOR SOLUTION INTRAVENOUS
Refills: 0 | OUTPATIENT
Start: 2016-08-17 | End: 2016-09-07

## 2022-07-09 RX ORDER — INSULIN ASPART 100 [IU]/ML
INJECTION, SOLUTION INTRAVENOUS; SUBCUTANEOUS ONCE A DAY
Refills: 0 | OUTPATIENT
Start: 2015-12-22 | End: 2016-01-25

## 2022-07-09 RX ORDER — LEVOTHYROXINE SODIUM 125 UG/1
TABLET ORAL ONCE A DAY
Refills: 0 | OUTPATIENT
Start: 2015-12-22 | End: 2016-01-25

## 2022-07-09 RX ORDER — METOPROLOL SUCCINATE 50 MG/1
TABLET, EXTENDED RELEASE ORAL
Refills: 0 | OUTPATIENT
Start: 2013-11-18 | End: 2014-10-10

## 2022-07-09 RX ORDER — FLUTICASONE PROPIONATE 50 UG/1
SPRAY, METERED NASAL
Refills: 0 | OUTPATIENT
Start: 2017-01-11 | End: 2017-02-08

## 2022-07-09 RX ORDER — FLUTICASONE PROPIONATE 50 UG/1
SPRAY, METERED NASAL
Refills: 0 | OUTPATIENT
Start: 2017-02-08 | End: 2017-03-08

## 2022-07-09 RX ORDER — DIAZEPAM 2 MG/1
TABLET ORAL
Refills: 0 | OUTPATIENT
Start: 2016-01-25 | End: 2016-02-01

## 2022-07-09 RX ORDER — TAMSULOSIN HYDROCHLORIDE 0.4 MG/1
CAPSULE ORAL ONCE A DAY
Refills: 0 | OUTPATIENT
Start: 2016-06-08 | End: 2016-06-28

## 2022-07-09 RX ORDER — ABATACEPT 250 MG/15ML
1.000 ONCE A MONTH INJECTION, POWDER, LYOPHILIZED, FOR SOLUTION INTRAVENOUS
Refills: 0 | OUTPATIENT
Start: 2016-06-28 | End: 2016-08-17

## 2022-07-09 RX ORDER — ABATACEPT 250 MG/15ML
1.000 ONCE A MONTH INJECTION, POWDER, LYOPHILIZED, FOR SOLUTION INTRAVENOUS
Refills: 0 | OUTPATIENT
Start: 2016-06-08 | End: 2016-06-28

## 2022-07-09 RX ORDER — ABATACEPT 250 MG/15ML
1.000 ONCE A MONTH INJECTION, POWDER, LYOPHILIZED, FOR SOLUTION INTRAVENOUS
Refills: 0 | OUTPATIENT
Start: 2016-02-01 | End: 2016-02-08

## 2022-07-09 RX ORDER — METOPROLOL SUCCINATE 25 MG/1
TABLET, EXTENDED RELEASE ORAL TWICE A DAY
Refills: 0 | OUTPATIENT
Start: 2016-02-01 | End: 2016-02-08

## 2022-07-09 RX ORDER — PREDNISONE 5 MG/1
TABLET ORAL
Refills: 0 | OUTPATIENT
Start: 2014-10-10 | End: 2015-12-22

## 2022-07-09 RX ORDER — FLUTICASONE PROPIONATE 44 UG/1
PRN AEROSOL, METERED RESPIRATORY (INHALATION)
Refills: 0 | OUTPATIENT
Start: 2017-02-08 | End: 2017-03-08

## 2022-07-09 RX ORDER — LEVOTHYROXINE SODIUM 125 UG/1
TABLET ORAL ONCE A DAY
Refills: 0 | OUTPATIENT
Start: 2016-06-28 | End: 2016-08-17

## 2022-07-09 RX ORDER — INSULIN ASPART 100 [IU]/ML
INJECTION, SOLUTION INTRAVENOUS; SUBCUTANEOUS
Refills: 0 | OUTPATIENT
Start: 2016-12-07 | End: 2017-01-11

## 2022-07-09 RX ORDER — ABATACEPT 250 MG/15ML
1.000 ONCE A MONTH INJECTION, POWDER, LYOPHILIZED, FOR SOLUTION INTRAVENOUS
Refills: 0 | OUTPATIENT
Start: 2016-09-07 | End: 2016-10-19

## 2022-07-09 RX ORDER — MESALAMINE 4 G/60ML
ONCE A DAY SUSPENSION RECTAL ONCE A DAY
Refills: 1 | OUTPATIENT
Start: 2016-05-11 | End: 2016-06-28

## 2022-07-09 RX ORDER — PREDNISONE 10 MG/1
TABLET ORAL
Refills: 0 | OUTPATIENT
Start: 2017-02-08 | End: 2017-03-08

## 2022-07-09 RX ORDER — BUDESONIDE 9 MG/1
TABLET, EXTENDED RELEASE ORAL
Refills: 0 | OUTPATIENT
Start: 2016-02-08 | End: 2016-03-16

## 2022-07-09 RX ORDER — NEOMYCIN/BACIT/P-MYX/HYDROCORT 3.5-10K-1
OINTMENT (GRAM) OPHTHALMIC (EYE)
Refills: 0 | OUTPATIENT
Start: 2017-01-11 | End: 2017-02-08

## 2022-07-09 RX ORDER — AZATHIOPRINE 50 MG/1
TABLET ORAL
Refills: 0 | OUTPATIENT
Start: 2013-02-14 | End: 2013-06-10

## 2022-07-09 RX ORDER — FLUTICASONE PROPIONATE 100 UG/1
POWDER, METERED RESPIRATORY (INHALATION)
Refills: 0 | OUTPATIENT
Start: 2014-10-10 | End: 2015-12-22

## 2022-07-09 RX ORDER — HYDROCORTISONE 25 MG/G
PRN CREAM TOPICAL
Refills: 0 | OUTPATIENT
Start: 2016-12-07 | End: 2017-01-11

## 2022-07-09 RX ORDER — FLUTICASONE PROPIONATE 50 UG/1
SPRAY, METERED NASAL
Refills: 0 | OUTPATIENT
Start: 2016-08-17 | End: 2016-09-07

## 2022-07-10 ENCOUNTER — TELEPHONE ENCOUNTER (OUTPATIENT)
Dept: URBAN - METROPOLITAN AREA CLINIC 121 | Facility: CLINIC | Age: 72
End: 2022-07-10

## 2022-07-10 RX ORDER — METOPROLOL SUCCINATE 25 MG/1
TABLET, EXTENDED RELEASE ORAL TWICE A DAY
Refills: 0 | Status: ACTIVE | COMMUNITY
Start: 2017-03-08

## 2022-07-10 RX ORDER — PREDNISONE 10 MG/1
TABLET ORAL
Refills: 0 | Status: ACTIVE | COMMUNITY
Start: 2017-03-08

## 2022-07-10 RX ORDER — INSULIN ASPART 100 [IU]/ML
INJECTION, SOLUTION INTRAVENOUS; SUBCUTANEOUS
Refills: 0 | Status: ACTIVE | COMMUNITY
Start: 2017-03-08

## 2022-07-10 RX ORDER — BUDESONIDE 9 MG/1
TABLET, EXTENDED RELEASE ORAL
Refills: 0 | Status: ACTIVE | COMMUNITY
Start: 2016-07-18

## 2022-07-10 RX ORDER — TAMSULOSIN HYDROCHLORIDE 0.4 MG/1
CAPSULE ORAL ONCE A DAY
Refills: 0 | Status: ACTIVE | COMMUNITY
Start: 2016-02-08

## 2022-07-10 RX ORDER — INSULIN ASPART 100 [IU]/ML
INJECTION, SOLUTION INTRAVENOUS; SUBCUTANEOUS ONCE A DAY
Refills: 0 | Status: ACTIVE | COMMUNITY
Start: 2016-02-08

## 2022-07-10 RX ORDER — MESALAMINE 1.2 G/1
TABLET, DELAYED RELEASE ORAL
Refills: 0 | Status: ACTIVE | COMMUNITY
Start: 2017-01-23

## 2022-07-10 RX ORDER — FLUTICASONE PROPIONATE 44 UG/1
PRN AEROSOL, METERED RESPIRATORY (INHALATION)
Refills: 0 | Status: ACTIVE | COMMUNITY
Start: 2017-03-08

## 2022-07-10 RX ORDER — PREDNISONE 5 MG/1
TABLET ORAL THREE TIMES A DAY
Refills: 0 | Status: ACTIVE | COMMUNITY
Start: 2016-02-08

## 2022-07-10 RX ORDER — SITAGLIPTIN AND METFORMIN HYDROCHLORIDE 1000; 50 MG/1; MG/1
TABLET, FILM COATED ORAL TWICE A DAY
Refills: 0 | Status: ACTIVE | COMMUNITY
Start: 2016-06-28

## 2022-07-10 RX ORDER — MESALAMINE 1.2 G/1
TABLET, DELAYED RELEASE ORAL
Refills: 0 | Status: ACTIVE | COMMUNITY
Start: 2017-02-08

## 2022-07-10 RX ORDER — MESALAMINE 4 G/60ML
USE ONE 4GM ENEMA QHS ENEMA RECTAL
Refills: 1 | Status: ACTIVE | COMMUNITY
Start: 2014-10-10

## 2022-07-10 RX ORDER — FLUTICASONE PROPIONATE 50 UG/1
SPRAY, METERED NASAL
Refills: 0 | Status: ACTIVE | COMMUNITY
Start: 2017-03-08

## 2022-07-10 RX ORDER — HYDROCORTISONE 100 MG/60ML
1 QD FOR 2 WEEKS THEN ONE QOD ENEMA RECTAL
Refills: 1 | Status: ACTIVE | COMMUNITY
Start: 2013-01-02

## 2022-07-10 RX ORDER — DIAZEPAM 2 MG/1
TABLET ORAL
Refills: 0 | Status: ACTIVE | COMMUNITY
Start: 2016-02-08

## 2022-07-10 RX ORDER — SITAGLIPTIN AND METFORMIN HYDROCHLORIDE 1000; 50 MG/1; MG/1
TABLET, FILM COATED, EXTENDED RELEASE ORAL TWICE A DAY
Refills: 0 | Status: ACTIVE | COMMUNITY
Start: 2017-03-08

## 2022-07-10 RX ORDER — NEOMYCIN/BACIT/P-MYX/HYDROCORT 3.5-10K-1
OINTMENT (GRAM) OPHTHALMIC (EYE)
Refills: 0 | Status: ACTIVE | COMMUNITY
Start: 2017-03-08

## 2022-07-10 RX ORDER — PREDNISONE 10 MG/1
ONE A DAY FOR 2 WEEKS THEN 1/2 A DAY TABLET ORAL
Refills: 0 | Status: ACTIVE | COMMUNITY
Start: 2013-01-02

## 2022-07-10 RX ORDER — ADALIMUMAB 40MG/0.8ML
1 TAB TWICE DAY KIT SUBCUTANEOUS
Refills: 0 | Status: ACTIVE | COMMUNITY
Start: 2017-03-08

## 2022-07-10 RX ORDER — LEVOTHYROXINE SODIUM 125 UG/1
TABLET ORAL ONCE A DAY
Refills: 0 | Status: ACTIVE | COMMUNITY
Start: 2017-03-08

## 2022-07-10 RX ORDER — LATANOPROST/PF 0.005 %
DROPS OPHTHALMIC (EYE)
Refills: 0 | Status: ACTIVE | COMMUNITY
Start: 2016-02-08

## 2022-07-10 RX ORDER — AZATHIOPRINE 50 MG/1
TABLET ORAL
Refills: 0 | Status: ACTIVE | COMMUNITY
Start: 2013-07-31

## 2022-07-10 RX ORDER — MESALAMINE 4 G/60ML
ENEMA RECTAL
Refills: 0 | Status: ACTIVE | COMMUNITY
Start: 2017-03-08

## 2022-07-10 RX ORDER — BUDESONIDE 9 MG/1
TABLET, EXTENDED RELEASE ORAL
Refills: 0 | Status: ACTIVE | COMMUNITY
Start: 2016-10-04

## 2022-09-01 ENCOUNTER — OFFICE VISIT (OUTPATIENT)
Dept: URBAN - METROPOLITAN AREA CLINIC 68 | Facility: CLINIC | Age: 72
End: 2022-09-01

## 2022-09-01 RX ORDER — FLUTICASONE PROPIONATE 50 UG/1
FLUTICASONE PROPIONATE( 50MCG/ACT NASAL  DAILY ) ACTIVE -HX ENTRY SPRAY, METERED NASAL DAILY
Status: ACTIVE | COMMUNITY
Start: 2022-03-21

## 2022-09-01 RX ORDER — VALSARTAN 80 MG/1
VALSARTAN( 80MG ORAL 1 TABLET DAILY ) ACTIVE -HX ENTRY TABLET, FILM COATED ORAL DAILY
Status: ACTIVE | COMMUNITY
Start: 2022-03-21

## 2022-09-01 RX ORDER — LEVOTHYROXINE SODIUM 125 UG/1
LEVOTHYROXINE SODIUM( 125MCG ORAL 1 DAILY ) ACTIVE -HX ENTRY TABLET ORAL DAILY
Status: ACTIVE | COMMUNITY
Start: 2022-03-21

## 2022-09-01 RX ORDER — ALBUTEROL SULFATE 90 UG/1
VENTOLIN HFA( 108 (90 BASE)MCG/ACT INHALATION  AS NEEDED ) ACTIVE -HX ENTRY AEROSOL, METERED RESPIRATORY (INHALATION) AS NEEDED
Status: ACTIVE | COMMUNITY
Start: 2022-03-21

## 2022-09-01 RX ORDER — DORZOLAMIDE HCL/PF 2 %
DORZOLAMIDE HCL( 2% OPHTHALMIC 2 DROPS TWICE A DAY ) ACTIVE -HX ENTRY DROPS OPHTHALMIC (EYE) TWICE A DAY
Status: ACTIVE | COMMUNITY
Start: 2022-03-21

## 2022-09-01 RX ORDER — IPRATROPIUM BROMIDE AND ALBUTEROL SULFATE 2.5; .5 MG/3ML; MG/3ML
IPRATROPIUM-ALBUTEROL( 0.5-2.5 (3)MG/3ML INHALATION  AS NEEDED ) ACTIVE -HX ENTRY SOLUTION RESPIRATORY (INHALATION) AS NEEDED
Status: ACTIVE | COMMUNITY
Start: 2022-03-21

## 2022-09-01 RX ORDER — METHYLCELLULOSE 500 MG/1
CITRUCEL( 500MG ORAL 2 AS NEEDED ) ACTIVE -HX ENTRY TABLET ORAL AS NEEDED
Status: ACTIVE | COMMUNITY
Start: 2022-03-21

## 2022-09-01 RX ORDER — CARBOXYMETHYLCELLULOSE SODIUM 10 MG/ML
THERATEARS( 1% OPHTHALMIC 2 GTTS ) ACTIVE -HX ENTRY GEL OPHTHALMIC
Status: ACTIVE | COMMUNITY
Start: 2022-03-21

## 2022-09-01 RX ORDER — EMPAGLIFLOZIN 25 MG/1
JARDIANCE( 25MG ORAL 1/2 DAILY ) ACTIVE -HX ENTRY TABLET, FILM COATED ORAL DAILY
Status: ACTIVE | COMMUNITY
Start: 2022-03-21

## 2022-09-01 RX ORDER — FLUTICASONE PROPIONATE 44 UG/1
FLOVENT HFA( 44MCG/ACT INHALATION  AS NEEDED ) ACTIVE -HX ENTRY AEROSOL, METERED RESPIRATORY (INHALATION) AS NEEDED
Status: ACTIVE | COMMUNITY
Start: 2022-03-21

## 2022-09-01 RX ORDER — FLUTICASONE FUROATE, UMECLIDINIUM BROMIDE AND VILANTEROL TRIFENATATE 200; 62.5; 25 UG/1; UG/1; UG/1
TRELEGY ELLIPTA( 200-62.5-25MCG/INH INHALATION  PRN ) ACTIVE -HX ENTRY POWDER RESPIRATORY (INHALATION) PRN
Status: ACTIVE | COMMUNITY
Start: 2022-03-21

## 2022-09-01 RX ORDER — MORPHINE SULFATE 15 MG
MORPHINE SULFATE( 15MG ORAL 1/2 TABLET EVERY8  HOURS AS NEEDED  ) ACTIVE -HX ENTRY TABLET ORAL
Status: ACTIVE | COMMUNITY
Start: 2022-03-21

## 2022-09-01 RX ORDER — TIZANIDINE HYDROCHLORIDE 4 MG/1
TIZANIDINE HCL( 4MG ORAL 1 EVERY 8 HOURS ) ACTIVE -HX ENTRY CAPSULE ORAL EVERY 8 HOURS
Status: ACTIVE | COMMUNITY
Start: 2022-03-21

## 2022-09-01 RX ORDER — SITAGLIPTIN AND METFORMIN HYDROCHLORIDE 50; 1000 MG/1; MG/1
JANUMET( 50-1000MG ORAL  TWICE DAILY ) ACTIVE -HX ENTRY TABLET, FILM COATED ORAL TWICE DAILY
Status: ACTIVE | COMMUNITY
Start: 2022-03-21

## 2022-09-01 RX ORDER — NALOXONE HYDROCHLORIDE 4 MG/.1ML
NARCAN( 4MG/0.1ML NASAL  AS NEEDED ) ACTIVE -HX ENTRY SPRAY NASAL AS NEEDED
Status: ACTIVE | COMMUNITY
Start: 2022-03-21

## 2022-09-01 NOTE — HPI-MIGRATED HPI
Ulcerative colitis : Clinically doing well without any GI complaints.  Having daily formed BM.  REmains on filgotinib 100 mg daily.  No bleeding, urgency; no extraintestinal symptoms such as oral ulcers, rash.  He continues to have low back pain and hip pain when he stands and walks for more than 100 feet.

## 2022-09-01 NOTE — EXAM-MIGRATED EXAMINATIONS
General Examination: General appearance: -> pale   Head: -> normocephalic, atraumatic   Eyes: -> pupils equal, round, reactive to light and accommodation   Ears: -> normal   Throat: -> clear   Heart: -> regular rate and rhythm without murmurs, gallops, clicks or rubs   Lungs: -> clear to auscultation bilaterally, with good air movement and no rales, rhonchi or wheezes   Abdomen: -> soft with good bowel sounds, nontender, and no masses or hepatosplenomegaly   Rectal: -> not examined   Extremities: -> normal extremity with no clubbing, cyanosis or edema

## 2022-12-02 ENCOUNTER — OFFICE VISIT (OUTPATIENT)
Dept: URBAN - METROPOLITAN AREA CLINIC 68 | Facility: CLINIC | Age: 72
End: 2022-12-02

## 2022-12-02 RX ORDER — FLUTICASONE PROPIONATE 44 UG/1
FLOVENT HFA( 44MCG/ACT INHALATION  AS NEEDED ) ACTIVE -HX ENTRY AEROSOL, METERED RESPIRATORY (INHALATION) AS NEEDED
Status: ACTIVE | COMMUNITY
Start: 2022-03-21

## 2022-12-02 RX ORDER — METHYLCELLULOSE 500 MG/1
CITRUCEL( 500MG ORAL 2 AS NEEDED ) ACTIVE -HX ENTRY TABLET ORAL AS NEEDED
Status: ACTIVE | COMMUNITY
Start: 2022-03-21

## 2022-12-02 RX ORDER — TIZANIDINE HYDROCHLORIDE 4 MG/1
TIZANIDINE HCL( 4MG ORAL 1 EVERY 8 HOURS ) ACTIVE -HX ENTRY CAPSULE ORAL EVERY 8 HOURS
Status: ACTIVE | COMMUNITY
Start: 2022-03-21

## 2022-12-02 RX ORDER — FLUTICASONE PROPIONATE 50 UG/1
FLUTICASONE PROPIONATE( 50MCG/ACT NASAL  DAILY ) ACTIVE -HX ENTRY SPRAY, METERED NASAL DAILY
Status: ACTIVE | COMMUNITY
Start: 2022-03-21

## 2022-12-02 RX ORDER — EMPAGLIFLOZIN 25 MG/1
JARDIANCE( 25MG ORAL 1/2 DAILY ) ACTIVE -HX ENTRY TABLET, FILM COATED ORAL DAILY
Status: ACTIVE | COMMUNITY
Start: 2022-03-21

## 2022-12-02 RX ORDER — VALSARTAN 80 MG/1
VALSARTAN( 80MG ORAL 1 TABLET DAILY ) ACTIVE -HX ENTRY TABLET, FILM COATED ORAL DAILY
Status: ACTIVE | COMMUNITY
Start: 2022-03-21

## 2022-12-02 RX ORDER — DORZOLAMIDE HCL/PF 2 %
DORZOLAMIDE HCL( 2% OPHTHALMIC 2 DROPS TWICE A DAY ) ACTIVE -HX ENTRY DROPS OPHTHALMIC (EYE) TWICE A DAY
Status: ACTIVE | COMMUNITY
Start: 2022-03-21

## 2022-12-02 RX ORDER — NALOXONE HYDROCHLORIDE 4 MG/.1ML
NARCAN( 4MG/0.1ML NASAL  AS NEEDED ) ACTIVE -HX ENTRY SPRAY NASAL AS NEEDED
Status: ACTIVE | COMMUNITY
Start: 2022-03-21

## 2022-12-02 RX ORDER — FLUTICASONE FUROATE, UMECLIDINIUM BROMIDE AND VILANTEROL TRIFENATATE 200; 62.5; 25 UG/1; UG/1; UG/1
TRELEGY ELLIPTA( 200-62.5-25MCG/INH INHALATION  PRN ) ACTIVE -HX ENTRY POWDER RESPIRATORY (INHALATION) PRN
Status: ACTIVE | COMMUNITY
Start: 2022-03-21

## 2022-12-02 RX ORDER — SITAGLIPTIN AND METFORMIN HYDROCHLORIDE 50; 1000 MG/1; MG/1
JANUMET( 50-1000MG ORAL  TWICE DAILY ) ACTIVE -HX ENTRY TABLET, FILM COATED ORAL TWICE DAILY
Status: ACTIVE | COMMUNITY
Start: 2022-03-21

## 2022-12-02 RX ORDER — CARBOXYMETHYLCELLULOSE SODIUM 10 MG/ML
THERATEARS( 1% OPHTHALMIC 2 GTTS ) ACTIVE -HX ENTRY GEL OPHTHALMIC
Status: ACTIVE | COMMUNITY
Start: 2022-03-21

## 2022-12-02 RX ORDER — MORPHINE SULFATE 15 MG
MORPHINE SULFATE( 15MG ORAL 1/2 TABLET EVERY8  HOURS AS NEEDED  ) ACTIVE -HX ENTRY TABLET ORAL
Status: ACTIVE | COMMUNITY
Start: 2022-03-21

## 2022-12-02 RX ORDER — LEVOTHYROXINE SODIUM 125 UG/1
LEVOTHYROXINE SODIUM( 125MCG ORAL 1 DAILY ) ACTIVE -HX ENTRY TABLET ORAL DAILY
Status: ACTIVE | COMMUNITY
Start: 2022-03-21

## 2022-12-02 RX ORDER — IPRATROPIUM BROMIDE AND ALBUTEROL SULFATE 2.5; .5 MG/3ML; MG/3ML
IPRATROPIUM-ALBUTEROL( 0.5-2.5 (3)MG/3ML INHALATION  AS NEEDED ) ACTIVE -HX ENTRY SOLUTION RESPIRATORY (INHALATION) AS NEEDED
Status: ACTIVE | COMMUNITY
Start: 2022-03-21

## 2022-12-02 RX ORDER — ALBUTEROL SULFATE 90 UG/1
VENTOLIN HFA( 108 (90 BASE)MCG/ACT INHALATION  AS NEEDED ) ACTIVE -HX ENTRY AEROSOL, METERED RESPIRATORY (INHALATION) AS NEEDED
Status: ACTIVE | COMMUNITY
Start: 2022-03-21

## 2022-12-02 NOTE — HPI-MIGRATED HPI
Diarrhea : Doing well having regular BM's without bleeding, urgency.  Having some difficulty with constipation due to ongoing use of opioid analagesics due to chronic back pain.

## 2022-12-30 ENCOUNTER — FOLLOW UP (OUTPATIENT)
Dept: URBAN - METROPOLITAN AREA CLINIC 26 | Facility: CLINIC | Age: 72
End: 2022-12-30

## 2022-12-30 VITALS — SYSTOLIC BLOOD PRESSURE: 163 MMHG | DIASTOLIC BLOOD PRESSURE: 83 MMHG | HEART RATE: 63 BPM | HEIGHT: 60 IN

## 2022-12-30 PROCEDURE — 92235 FLUORESCEIN ANGRPH MLTIFRAME: CPT

## 2022-12-30 PROCEDURE — 92134 CPTRZ OPH DX IMG PST SGM RTA: CPT

## 2022-12-30 PROCEDURE — 92133 CPTRZD OPH DX IMG PST SGM ON: CPT

## 2022-12-30 PROCEDURE — 92014 COMPRE OPH EXAM EST PT 1/>: CPT

## 2022-12-30 PROCEDURE — 92250 FUNDUS PHOTOGRAPHY W/I&R: CPT

## 2022-12-30 ASSESSMENT — VISUAL ACUITY
OD_CC: 20/25+2
OS_CC: 20/20-2

## 2022-12-30 ASSESSMENT — TONOMETRY
OS_IOP_MMHG: 17
OD_IOP_MMHG: 18

## 2023-02-23 ENCOUNTER — OFFICE VISIT (OUTPATIENT)
Dept: URBAN - METROPOLITAN AREA CLINIC 68 | Facility: CLINIC | Age: 73
End: 2023-02-23

## 2023-02-23 RX ORDER — ALBUTEROL SULFATE 90 UG/1
VENTOLIN HFA( 108 (90 BASE)MCG/ACT INHALATION  AS NEEDED ) ACTIVE -HX ENTRY AEROSOL, METERED RESPIRATORY (INHALATION) AS NEEDED
Status: ACTIVE | COMMUNITY
Start: 2022-03-21

## 2023-02-23 RX ORDER — MORPHINE SULFATE 15 MG
MORPHINE SULFATE( 15MG ORAL 1/2 TABLET EVERY8  HOURS AS NEEDED  ) ACTIVE -HX ENTRY TABLET ORAL
Status: ACTIVE | COMMUNITY
Start: 2022-03-21

## 2023-02-23 RX ORDER — IPRATROPIUM BROMIDE AND ALBUTEROL SULFATE 2.5; .5 MG/3ML; MG/3ML
IPRATROPIUM-ALBUTEROL( 0.5-2.5 (3)MG/3ML INHALATION  AS NEEDED ) ACTIVE -HX ENTRY SOLUTION RESPIRATORY (INHALATION) AS NEEDED
Status: ACTIVE | COMMUNITY
Start: 2022-03-21

## 2023-02-23 RX ORDER — FLUTICASONE FUROATE, UMECLIDINIUM BROMIDE AND VILANTEROL TRIFENATATE 200; 62.5; 25 UG/1; UG/1; UG/1
TRELEGY ELLIPTA( 200-62.5-25MCG/INH INHALATION  PRN ) ACTIVE -HX ENTRY POWDER RESPIRATORY (INHALATION) PRN
Status: DISCONTINUED | COMMUNITY
Start: 2022-03-21 | End: 2022-05-01

## 2023-02-23 RX ORDER — FLUTICASONE PROPIONATE 50 UG/1
FLUTICASONE PROPIONATE( 50MCG/ACT NASAL  DAILY ) ACTIVE -HX ENTRY SPRAY, METERED NASAL DAILY
Status: ACTIVE | COMMUNITY
Start: 2022-03-21

## 2023-02-23 RX ORDER — METHYLCELLULOSE 500 MG/1
CITRUCEL( 500MG ORAL 2 AS NEEDED ) ACTIVE -HX ENTRY TABLET ORAL AS NEEDED
Status: ACTIVE | COMMUNITY
Start: 2022-03-21

## 2023-02-23 RX ORDER — LEVOTHYROXINE SODIUM 125 UG/1
LEVOTHYROXINE SODIUM( 125MCG ORAL 1 DAILY ) ACTIVE -HX ENTRY TABLET ORAL DAILY
Status: ACTIVE | COMMUNITY
Start: 2022-03-21

## 2023-02-23 RX ORDER — EMPAGLIFLOZIN 25 MG/1
JARDIANCE( 25MG ORAL 1/2 DAILY ) ACTIVE -HX ENTRY TABLET, FILM COATED ORAL DAILY
Status: DISCONTINUED | COMMUNITY
Start: 2022-03-21 | End: 2023-02-04

## 2023-02-23 RX ORDER — DORZOLAMIDE HCL/PF 2 %
DORZOLAMIDE HCL( 2% OPHTHALMIC 2 DROPS TWICE A DAY ) ACTIVE -HX ENTRY DROPS OPHTHALMIC (EYE) TWICE A DAY
Status: ACTIVE | COMMUNITY
Start: 2022-03-21

## 2023-02-23 RX ORDER — NALOXONE HYDROCHLORIDE 4 MG/.1ML
NARCAN( 4MG/0.1ML NASAL  AS NEEDED ) ACTIVE -HX ENTRY SPRAY NASAL AS NEEDED
Status: ACTIVE | COMMUNITY
Start: 2022-03-21

## 2023-02-23 RX ORDER — SITAGLIPTIN AND METFORMIN HYDROCHLORIDE 50; 1000 MG/1; MG/1
JANUMET( 50-1000MG ORAL  TWICE DAILY ) ACTIVE -HX ENTRY TABLET, FILM COATED ORAL TWICE DAILY
Status: ACTIVE | COMMUNITY
Start: 2022-03-21 | End: 2023-02-04

## 2023-02-23 RX ORDER — VALSARTAN 80 MG/1
VALSARTAN( 80MG ORAL 1 TABLET DAILY ) ACTIVE -HX ENTRY TABLET, FILM COATED ORAL DAILY
Status: DISCONTINUED | COMMUNITY
Start: 2022-03-21 | End: 2022-12-15

## 2023-02-23 RX ORDER — CARBOXYMETHYLCELLULOSE SODIUM 10 MG/ML
THERATEARS( 1% OPHTHALMIC 2 GTTS ) ACTIVE -HX ENTRY GEL OPHTHALMIC
Status: ACTIVE | COMMUNITY
Start: 2022-03-21

## 2023-02-23 RX ORDER — PIOGLITAZONE 15 MG/1
1 TABLET TABLET ORAL ONCE A DAY
Status: ACTIVE | COMMUNITY
Start: 2023-02-04

## 2023-02-23 RX ORDER — TIZANIDINE HYDROCHLORIDE 4 MG/1
TIZANIDINE HCL( 4MG ORAL 1 EVERY 8 HOURS ) ACTIVE -HX ENTRY CAPSULE ORAL EVERY 8 HOURS
Status: DISCONTINUED | COMMUNITY
Start: 2022-03-21 | End: 2022-12-15

## 2023-02-23 RX ORDER — METFORMIN HYDROCHLORIDE 500 MG/1
1 TABLET WITH A MEAL TABLET, FILM COATED ORAL TWICE A DAY
Status: ACTIVE | COMMUNITY
Start: 2023-02-04

## 2023-02-23 RX ORDER — FLUTICASONE PROPIONATE 44 UG/1
FLOVENT HFA( 44MCG/ACT INHALATION  AS NEEDED ) ACTIVE -HX ENTRY AEROSOL, METERED RESPIRATORY (INHALATION) AS NEEDED
Status: ACTIVE | COMMUNITY
Start: 2022-03-21

## 2023-02-23 NOTE — EXAM-MIGRATED EXAMINATIONS
General Examination: Neck / thyroid: -> carotid pulses are normal and without bruits, neck is supple, with full range of motion and no cervical lymphadenopathy   Heart: -> regular rate and rhythm without murmurs, gallops, clicks or rubs, S1 and S2 are normal and no S3 and S4 gallop   Lungs: -> clear to auscultation bilaterally, with good air movement and no rales, rhonchi or wheezes   Abdomen: -> soft with good bowel sounds, nontender, and no masses or hepatosplenomegaly   Rectal: -> not examined   Extremities: -> normal extremity with no clubbing, cyanosis or edema   General appearance: -> alert, pleasant, well-nourished and in no acute distress   Head: -> normocephalic, atraumatic   Throat: -> clear

## 2023-02-23 NOTE — HPI-MIGRATED HPI
Ulcerative colitis : He returns in follow-up doing well from GI standpoint with 1 formed BM per day without urgency, frequency or bleeding.  He has no extraintestinal manifestations of UC.  He has multiple other complaints including prominently overwhelming fatigue and upper thigh discomfort when he exerts himself; no dyspnea or chest pain.  He was evaluated by his cardiologist (Conchis) and told he had no cardiac issue.  HE was also seen by Dr. Cuevas (rheumtologist) and was told there was "inflammation" prompting initiation of low dose of prednisone (5 or 10 mg ?) which has not had any great benefit.  Back is better with less pain.  Blood sugars higher especially since beginning prednisone.

## 2023-05-18 ENCOUNTER — OFFICE VISIT (OUTPATIENT)
Dept: URBAN - METROPOLITAN AREA CLINIC 68 | Facility: CLINIC | Age: 73
End: 2023-05-18

## 2023-10-25 ENCOUNTER — FOLLOW UP (OUTPATIENT)
Dept: URBAN - METROPOLITAN AREA CLINIC 26 | Facility: CLINIC | Age: 73
End: 2023-10-25

## 2023-10-25 VITALS
WEIGHT: 260 LBS | BODY MASS INDEX: 39.4 KG/M2 | HEART RATE: 74 BPM | DIASTOLIC BLOOD PRESSURE: 82 MMHG | SYSTOLIC BLOOD PRESSURE: 143 MMHG | HEIGHT: 68 IN

## 2023-10-25 DIAGNOSIS — H35.373: ICD-10-CM

## 2023-10-25 DIAGNOSIS — H43.812: ICD-10-CM

## 2023-10-25 DIAGNOSIS — H40.9: ICD-10-CM

## 2023-10-25 DIAGNOSIS — E11.3313: ICD-10-CM

## 2023-10-25 DIAGNOSIS — H04.123: ICD-10-CM

## 2023-10-25 DIAGNOSIS — H43.393: ICD-10-CM

## 2023-10-25 DIAGNOSIS — H35.3131: ICD-10-CM

## 2023-10-25 PROCEDURE — 67028 INJECTION EYE DRUG: CPT

## 2023-10-25 PROCEDURE — 92014 COMPRE OPH EXAM EST PT 1/>: CPT | Mod: 25

## 2023-10-25 PROCEDURE — 92235 FLUORESCEIN ANGRPH MLTIFRAME: CPT

## 2023-10-25 PROCEDURE — 92134 CPTRZ OPH DX IMG PST SGM RTA: CPT

## 2023-10-25 PROCEDURE — 92250 FUNDUS PHOTOGRAPHY W/I&R: CPT | Mod: 59

## 2023-10-25 PROCEDURE — Q5128DME CIMERLI 0.3MG: Mod: JZ

## 2023-10-25 ASSESSMENT — VISUAL ACUITY
OS_CC: 20/20-1
OD_CC: 20/30+1

## 2023-10-25 ASSESSMENT — TONOMETRY
OS_IOP_MMHG: 19
OD_IOP_MMHG: 17

## 2023-11-29 ENCOUNTER — CLINIC PROCEDURE ONLY (OUTPATIENT)
Dept: URBAN - METROPOLITAN AREA CLINIC 26 | Facility: CLINIC | Age: 73
End: 2023-11-29

## 2023-11-29 DIAGNOSIS — H35.3131: ICD-10-CM

## 2023-11-29 DIAGNOSIS — E11.3313: ICD-10-CM

## 2023-11-29 DIAGNOSIS — H35.373: ICD-10-CM

## 2023-11-29 PROCEDURE — Q5128DME CIMERLI 0.3MG

## 2023-11-29 PROCEDURE — 67028 INJECTION EYE DRUG: CPT

## 2023-11-29 PROCEDURE — 92250 FUNDUS PHOTOGRAPHY W/I&R: CPT

## 2023-11-29 PROCEDURE — 92134 CPTRZ OPH DX IMG PST SGM RTA: CPT

## 2023-12-29 ENCOUNTER — CLINIC PROCEDURE ONLY (OUTPATIENT)
Dept: URBAN - METROPOLITAN AREA CLINIC 26 | Facility: CLINIC | Age: 73
End: 2023-12-29

## 2023-12-29 DIAGNOSIS — Z79.4: ICD-10-CM

## 2023-12-29 DIAGNOSIS — E11.3313: ICD-10-CM

## 2023-12-29 PROCEDURE — 67028 INJECTION EYE DRUG: CPT

## 2023-12-29 PROCEDURE — 92134 CPTRZ OPH DX IMG PST SGM RTA: CPT

## 2023-12-29 PROCEDURE — 92250 FUNDUS PHOTOGRAPHY W/I&R: CPT

## 2023-12-29 PROCEDURE — Q5128DME CIMERLI 0.3MG

## 2023-12-29 ASSESSMENT — TONOMETRY: OS_IOP_MMHG: 18

## 2024-02-05 ENCOUNTER — CLINIC PROCEDURE ONLY (OUTPATIENT)
Dept: URBAN - METROPOLITAN AREA CLINIC 26 | Facility: CLINIC | Age: 74
End: 2024-02-05

## 2024-02-05 DIAGNOSIS — E11.3313: ICD-10-CM

## 2024-02-05 DIAGNOSIS — H35.373: ICD-10-CM

## 2024-02-05 PROCEDURE — 92250 FUNDUS PHOTOGRAPHY W/I&R: CPT

## 2024-02-05 PROCEDURE — 92134 CPTRZ OPH DX IMG PST SGM RTA: CPT

## 2024-02-05 PROCEDURE — 67028 INJECTION EYE DRUG: CPT

## 2024-05-08 ENCOUNTER — CLINIC PROCEDURE ONLY (OUTPATIENT)
Dept: URBAN - METROPOLITAN AREA CLINIC 26 | Facility: CLINIC | Age: 74
End: 2024-05-08

## 2024-05-08 DIAGNOSIS — E11.3313: ICD-10-CM

## 2024-05-08 DIAGNOSIS — Z79.4: ICD-10-CM

## 2024-05-08 PROCEDURE — 92134 CPTRZ OPH DX IMG PST SGM RTA: CPT

## 2024-05-08 PROCEDURE — 92250 FUNDUS PHOTOGRAPHY W/I&R: CPT | Mod: 59

## 2024-05-08 PROCEDURE — 67028 INJECTION EYE DRUG: CPT

## 2024-05-08 ASSESSMENT — TONOMETRY: OS_IOP_MMHG: 21

## 2024-10-07 ENCOUNTER — COMPREHENSIVE EXAM (OUTPATIENT)
Dept: URBAN - METROPOLITAN AREA CLINIC 26 | Facility: CLINIC | Age: 74
End: 2024-10-07

## 2024-10-07 VITALS — HEIGHT: 68 IN | BODY MASS INDEX: 39.71 KG/M2 | WEIGHT: 262 LBS

## 2024-10-07 DIAGNOSIS — H04.123: ICD-10-CM

## 2024-10-07 DIAGNOSIS — H02.836: ICD-10-CM

## 2024-10-07 DIAGNOSIS — E11.3313: ICD-10-CM

## 2024-10-07 DIAGNOSIS — H01.003: ICD-10-CM

## 2024-10-07 DIAGNOSIS — H40.9: ICD-10-CM

## 2024-10-07 DIAGNOSIS — H02.833: ICD-10-CM

## 2024-10-07 DIAGNOSIS — H43.812: ICD-10-CM

## 2024-10-07 DIAGNOSIS — H01.006: ICD-10-CM

## 2024-10-07 DIAGNOSIS — H35.373: ICD-10-CM

## 2024-10-07 DIAGNOSIS — H35.3131: ICD-10-CM

## 2024-10-07 DIAGNOSIS — H43.393: ICD-10-CM

## 2024-10-07 DIAGNOSIS — Z79.4: ICD-10-CM

## 2024-10-07 PROCEDURE — 92250 FUNDUS PHOTOGRAPHY W/I&R: CPT | Mod: 59

## 2024-10-07 PROCEDURE — 92134 CPTRZ OPH DX IMG PST SGM RTA: CPT

## 2024-10-07 PROCEDURE — 92014 COMPRE OPH EXAM EST PT 1/>: CPT | Mod: 25

## 2024-10-07 PROCEDURE — 67028 INJECTION EYE DRUG: CPT

## 2025-02-14 ENCOUNTER — TELEPHONE ENCOUNTER (OUTPATIENT)
Dept: URBAN - METROPOLITAN AREA CLINIC 7 | Facility: CLINIC | Age: 75
End: 2025-02-14

## 2025-02-27 ENCOUNTER — DASHBOARD ENCOUNTERS (OUTPATIENT)
Age: 75
End: 2025-02-27

## 2025-02-27 ENCOUNTER — OFFICE VISIT (OUTPATIENT)
Dept: URBAN - METROPOLITAN AREA CLINIC 7 | Facility: CLINIC | Age: 75
End: 2025-02-27
Payer: MEDICARE

## 2025-02-27 VITALS — HEIGHT: 68 IN | TEMPERATURE: 97.7 F | RESPIRATION RATE: 16 BRPM | WEIGHT: 251 LBS | BODY MASS INDEX: 38.04 KG/M2

## 2025-02-27 DIAGNOSIS — I10 HYPERTENSION, UNSPECIFIED TYPE: ICD-10-CM

## 2025-02-27 DIAGNOSIS — J45.909 ASTHMA, UNSPECIFIED ASTHMA SEVERITY, UNSPECIFIED WHETHER COMPLICATED, UNSPECIFIED WHETHER PERSISTENT: ICD-10-CM

## 2025-02-27 DIAGNOSIS — E11.8 TYPE 2 DIABETES MELLITUS WITH COMPLICATION, UNSPECIFIED LONG TERM INSULIN USE STATUS: ICD-10-CM

## 2025-02-27 DIAGNOSIS — K21.9 GERD WITHOUT ESOPHAGITIS: ICD-10-CM

## 2025-02-27 DIAGNOSIS — N40.0 BENIGN PROSTATIC HYPERPLASIA WITHOUT LOWER URINARY TRACT SYMPTOMS: ICD-10-CM

## 2025-02-27 DIAGNOSIS — M54.9 DORSALGIA, UNSPECIFIED: ICD-10-CM

## 2025-02-27 DIAGNOSIS — E03.9 HYPOTHYROIDISM, UNSPECIFIED TYPE: ICD-10-CM

## 2025-02-27 DIAGNOSIS — M06.9 RHEUMATOID ARTHRITIS, INVOLVING UNSPECIFIED SITE, UNSPECIFIED RHEUMATOID FACTOR PRESENCE: ICD-10-CM

## 2025-02-27 DIAGNOSIS — C22.1 CHOLANGIOCARCINOMA: ICD-10-CM

## 2025-02-27 DIAGNOSIS — K51.90 ULCERATIVE COLITIS: ICD-10-CM

## 2025-02-27 PROCEDURE — 99214 OFFICE O/P EST MOD 30 MIN: CPT | Performed by: INTERNAL MEDICINE

## 2025-02-27 RX ORDER — DORZOLAMIDE HCL/PF 2 %
DORZOLAMIDE HCL( 2% OPHTHALMIC 2 DROPS TWICE A DAY ) ACTIVE -HX ENTRY DROPS OPHTHALMIC (EYE) TWICE A DAY
Status: DISCONTINUED | COMMUNITY
Start: 2022-03-21

## 2025-02-27 RX ORDER — METFORMIN HYDROCHLORIDE 500 MG/1
1 TABLET WITH A MEAL TABLET, FILM COATED ORAL TWICE A DAY
Status: DISCONTINUED | COMMUNITY
Start: 2023-02-04

## 2025-02-27 RX ORDER — CYCLOBENZAPRINE HYDROCHLORIDE 10 MG/1
1 TABLET TABLET, FILM COATED ORAL
Status: ACTIVE | COMMUNITY

## 2025-02-27 RX ORDER — DORZOLAMIDE HCL 20 MG/ML
1 DROP INTO AFFECTED EYE SOLUTION/ DROPS OPHTHALMIC THREE TIMES A DAY
Status: ACTIVE | COMMUNITY

## 2025-02-27 RX ORDER — ALBUTEROL SULFATE 90 UG/1
VENTOLIN HFA( 108 (90 BASE)MCG/ACT INHALATION  AS NEEDED ) ACTIVE -HX ENTRY AEROSOL, METERED RESPIRATORY (INHALATION) AS NEEDED
Status: DISCONTINUED | COMMUNITY
Start: 2022-03-21

## 2025-02-27 RX ORDER — LEVOTHYROXINE SODIUM 125 UG/1
LEVOTHYROXINE SODIUM( 125MCG ORAL 1 DAILY ) ACTIVE -HX ENTRY TABLET ORAL DAILY
Status: DISCONTINUED | COMMUNITY
Start: 2022-03-21

## 2025-02-27 RX ORDER — LATANOPROST 50 UG/ML
1 DROP INTO AFFECTED EYE IN THE EVENING SOLUTION/ DROPS OPHTHALMIC ONCE A DAY
Status: ACTIVE | COMMUNITY

## 2025-02-27 RX ORDER — METHYLCELLULOSE 500 MG/1
CITRUCEL( 500MG ORAL 2 AS NEEDED ) ACTIVE -HX ENTRY TABLET ORAL AS NEEDED
Status: DISCONTINUED | COMMUNITY
Start: 2022-03-21

## 2025-02-27 RX ORDER — ASPIRIN 81 MG/1
1 TABLET TABLET, CHEWABLE ORAL ONCE A DAY
Status: ACTIVE | COMMUNITY

## 2025-02-27 RX ORDER — INSULIN ASPART 100 [IU]/ML
AS DIRECTED INJECTION, SOLUTION INTRAVENOUS; SUBCUTANEOUS
Status: ACTIVE | COMMUNITY

## 2025-02-27 RX ORDER — LEVOTHYROXINE SODIUM 100 UG/1
1 TABLET IN THE MORNING ON AN EMPTY STOMACH TABLET ORAL ONCE A DAY
Status: ACTIVE | COMMUNITY

## 2025-02-27 RX ORDER — LEVALBUTEROL TARTRATE 45 UG/1
1 PUFF AS NEEDED AEROSOL, METERED ORAL
Status: ACTIVE | COMMUNITY

## 2025-02-27 RX ORDER — IPRATROPIUM BROMIDE AND ALBUTEROL SULFATE 2.5; .5 MG/3ML; MG/3ML
IPRATROPIUM-ALBUTEROL( 0.5-2.5 (3)MG/3ML INHALATION  AS NEEDED ) ACTIVE -HX ENTRY SOLUTION RESPIRATORY (INHALATION) AS NEEDED
Status: DISCONTINUED | COMMUNITY
Start: 2022-03-21

## 2025-02-27 RX ORDER — METFORMIN HYDROCHLORIDE 1000 MG/1
1 TABLET WITH A MEAL TABLET, FILM COATED ORAL TWICE DAILY
Status: ACTIVE | COMMUNITY

## 2025-02-27 RX ORDER — CLOPIDOGREL BISULFATE 75 MG/1
1 TABLET TABLET ORAL ONCE A DAY
Status: ACTIVE | COMMUNITY

## 2025-02-27 RX ORDER — FLUTICASONE PROPIONATE 50 UG/1
FLUTICASONE PROPIONATE( 50MCG/ACT NASAL  DAILY ) ACTIVE -HX ENTRY SPRAY, METERED NASAL DAILY
Status: DISCONTINUED | COMMUNITY
Start: 2022-03-21

## 2025-02-27 RX ORDER — BUDESONIDE 3 MG/1
2 CAPSULES CAPSULE, DELAYED RELEASE PELLETS ORAL ONCE A DAY
Qty: 120 CAPSULE | Refills: 0 | OUTPATIENT
Start: 2025-02-27

## 2025-02-27 RX ORDER — IPRATROPIUM BROMIDE 0.5 MG/2.5ML
2.5 ML AS NEEDED SOLUTION RESPIRATORY (INHALATION)
Status: ACTIVE | COMMUNITY

## 2025-02-27 RX ORDER — ISOSORBIDE MONONITRATE 30 MG/1
1 TABLET IN THE MORNING TABLET, EXTENDED RELEASE ORAL ONCE A DAY
Status: ACTIVE | COMMUNITY

## 2025-02-27 RX ORDER — METOPROLOL SUCCINATE 50 MG/1
1 TABLET TABLET, FILM COATED, EXTENDED RELEASE ORAL ONCE A DAY
Status: ACTIVE | COMMUNITY

## 2025-02-27 RX ORDER — AZELASTINE 1 MG/ML
2 PUFFS (1 SPRAY IN EACH NOSTRIL) SPRAY, METERED NASAL TWICE A DAY
Status: ACTIVE | COMMUNITY

## 2025-02-27 RX ORDER — PIOGLITAZONE 15 MG/1
1 TABLET TABLET ORAL ONCE A DAY
Status: DISCONTINUED | COMMUNITY
Start: 2023-02-04

## 2025-02-27 RX ORDER — CARBOXYMETHYLCELLULOSE SODIUM 10 MG/ML
THERATEARS( 1% OPHTHALMIC 2 GTTS ) ACTIVE -HX ENTRY GEL OPHTHALMIC
Status: DISCONTINUED | COMMUNITY
Start: 2022-03-21

## 2025-02-27 RX ORDER — MORPHINE SULFATE 15 MG
MORPHINE SULFATE( 15MG ORAL 1/2 TABLET EVERY8  HOURS AS NEEDED  ) ACTIVE -HX ENTRY TABLET ORAL
Status: ACTIVE | COMMUNITY
Start: 2022-03-21

## 2025-02-27 RX ORDER — ALBUTEROL SULFATE 2.5 MG/3ML
3 ML AS NEEDED SOLUTION RESPIRATORY (INHALATION)
Status: ACTIVE | COMMUNITY

## 2025-02-27 RX ORDER — ATORVASTATIN CALCIUM 80 MG/1
1 TABLET TABLET, FILM COATED ORAL ONCE A DAY
Status: ACTIVE | COMMUNITY

## 2025-02-27 RX ORDER — FLUTICASONE PROPIONATE 44 UG/1
FLOVENT HFA( 44MCG/ACT INHALATION  AS NEEDED ) ACTIVE -HX ENTRY AEROSOL, METERED RESPIRATORY (INHALATION) AS NEEDED
Status: DISCONTINUED | COMMUNITY
Start: 2022-03-21

## 2025-02-27 RX ORDER — NITROGLYCERIN 0.4 MG/1
1 TABLET UNDER THE TONGUE AND ALLOW TO DISSOLVE AS NEEDED. TAKE EVERY 5 MINUTES UP TO 3 TIMES IF CHEST PAIN PERSISTS TABLET SUBLINGUAL THREE TIMES A DAY
Status: ACTIVE | COMMUNITY

## 2025-02-27 RX ORDER — NALOXONE HYDROCHLORIDE 4 MG/.1ML
NARCAN( 4MG/0.1ML NASAL  AS NEEDED ) ACTIVE -HX ENTRY SPRAY NASAL AS NEEDED
Status: DISCONTINUED | COMMUNITY
Start: 2022-03-21

## 2025-02-27 RX ORDER — EMPAGLIFLOZIN 25 MG/1
1 TABLET TABLET, FILM COATED ORAL ONCE A DAY
Status: ACTIVE | COMMUNITY

## 2025-02-28 ENCOUNTER — TELEPHONE ENCOUNTER (OUTPATIENT)
Dept: URBAN - METROPOLITAN AREA CLINIC 7 | Facility: CLINIC | Age: 75
End: 2025-02-28

## 2025-03-05 ENCOUNTER — TELEPHONE ENCOUNTER (OUTPATIENT)
Dept: URBAN - METROPOLITAN AREA CLINIC 7 | Facility: CLINIC | Age: 75
End: 2025-03-05

## 2025-03-05 RX ORDER — VANCOMYCIN HYDROCHLORIDE 125 MG/1
1 CAPSULE CAPSULE ORAL
Qty: 56 | Refills: 0
Start: 2025-03-05 | End: 2025-03-19

## 2025-03-06 ENCOUNTER — TELEPHONE ENCOUNTER (OUTPATIENT)
Dept: URBAN - METROPOLITAN AREA CLINIC 7 | Facility: CLINIC | Age: 75
End: 2025-03-06

## 2025-04-22 ENCOUNTER — OFFICE VISIT (OUTPATIENT)
Dept: URBAN - METROPOLITAN AREA CLINIC 7 | Facility: CLINIC | Age: 75
End: 2025-04-22